# Patient Record
Sex: MALE | Race: WHITE | Employment: OTHER | RURAL
[De-identification: names, ages, dates, MRNs, and addresses within clinical notes are randomized per-mention and may not be internally consistent; named-entity substitution may affect disease eponyms.]

---

## 2017-01-09 ENCOUNTER — HOSPITAL ENCOUNTER (OUTPATIENT)
Dept: OTHER | Age: 82
Discharge: HOME | End: 2017-01-09
Attending: INTERNAL MEDICINE | Admitting: INTERNAL MEDICINE

## 2017-01-09 LAB
A/G RATIO: 1.3 (ref 0.8–2)
ALBUMIN SERPL-MCNC: 3.6 G/DL (ref 3.4–4.8)
ALP BLD-CCNC: 92 U/L (ref 25–100)
ALT SERPL-CCNC: 16 U/L (ref 4–36)
ANION GAP SERPL CALCULATED.3IONS-SCNC: 10 MMOL/L (ref 3–16)
AST SERPL-CCNC: 20 U/L (ref 8–33)
BILIRUB SERPL-MCNC: 0.5 MG/DL (ref 0.3–1.2)
BUN BLDV-MCNC: 25 MG/DL (ref 6–20)
CALCIUM SERPL-MCNC: 8.7 MG/DL (ref 8.5–10.5)
CHLORIDE BLD-SCNC: 99 MMOL/L (ref 98–107)
CO2: 33 MMOL/L (ref 20–30)
CREAT SERPL-MCNC: 1.3 MG/DL (ref 0.4–1.2)
GFR AFRICAN AMERICAN: >59
GFR NON-AFRICAN AMERICAN: 53
GLOBULIN: 2.7 G/DL
GLUCOSE BLD-MCNC: 86 MG/DL (ref 74–106)
POTASSIUM SERPL-SCNC: 3.7 MMOL/L (ref 3.4–5.1)
SODIUM BLD-SCNC: 142 MMOL/L (ref 136–145)
TOTAL PROTEIN: 6.3 G/DL (ref 6.4–8.3)

## 2017-01-26 ENCOUNTER — OUTSIDE SERVICES (OUTPATIENT)
Dept: PRIMARY CARE CLINIC | Age: 82
End: 2017-01-26
Payer: MEDICARE

## 2017-01-26 DIAGNOSIS — R05.9 COUGH: ICD-10-CM

## 2017-01-26 DIAGNOSIS — E03.9 HYPOTHYROIDISM, UNSPECIFIED TYPE: ICD-10-CM

## 2017-01-26 DIAGNOSIS — Z86.79 HISTORY OF INTRACRANIAL HEMORRHAGE: ICD-10-CM

## 2017-01-26 DIAGNOSIS — I48.91 ATRIAL FIBRILLATION, UNSPECIFIED TYPE (HCC): ICD-10-CM

## 2017-01-26 DIAGNOSIS — I69.359 CVA, OLD, HEMIPARESIS (HCC): Primary | ICD-10-CM

## 2017-01-26 DIAGNOSIS — D64.9 ANEMIA, UNSPECIFIED TYPE: ICD-10-CM

## 2017-01-26 DIAGNOSIS — I10 ESSENTIAL HYPERTENSION: ICD-10-CM

## 2017-01-26 PROCEDURE — 99308 SBSQ NF CARE LOW MDM 20: CPT | Performed by: INTERNAL MEDICINE

## 2017-03-27 ENCOUNTER — OUTSIDE SERVICES (OUTPATIENT)
Dept: PRIMARY CARE CLINIC | Age: 82
End: 2017-03-27
Payer: MEDICARE

## 2017-03-27 DIAGNOSIS — I48.91 ATRIAL FIBRILLATION, UNSPECIFIED TYPE (HCC): ICD-10-CM

## 2017-03-27 DIAGNOSIS — D64.9 ANEMIA, UNSPECIFIED TYPE: ICD-10-CM

## 2017-03-27 DIAGNOSIS — I69.359 CVA, OLD, HEMIPARESIS (HCC): Primary | ICD-10-CM

## 2017-03-27 DIAGNOSIS — I10 ESSENTIAL HYPERTENSION: ICD-10-CM

## 2017-03-27 DIAGNOSIS — Z12.5 SCREENING FOR PROSTATE CANCER: ICD-10-CM

## 2017-03-27 DIAGNOSIS — R05.9 COUGH: ICD-10-CM

## 2017-03-27 PROCEDURE — 99309 SBSQ NF CARE MODERATE MDM 30: CPT | Performed by: INTERNAL MEDICINE

## 2017-04-03 ENCOUNTER — HOSPITAL ENCOUNTER (OUTPATIENT)
Dept: OTHER | Age: 82
Discharge: HOME | End: 2017-04-03
Attending: INTERNAL MEDICINE | Admitting: INTERNAL MEDICINE

## 2017-04-03 LAB
ANION GAP SERPL CALCULATED.3IONS-SCNC: 7 MMOL/L (ref 3–16)
BASOPHILS ABSOLUTE: 0.1 K/UL (ref 0–0.1)
BASOPHILS RELATIVE PERCENT: 0.9 %
BUN BLDV-MCNC: 23 MG/DL (ref 6–20)
CALCIUM SERPL-MCNC: 8.7 MG/DL (ref 8.5–10.5)
CHLORIDE BLD-SCNC: 102 MMOL/L (ref 98–107)
CHOLESTEROL, TOTAL: 132 MG/DL (ref 0–200)
CO2: 32 MMOL/L (ref 20–30)
CREAT SERPL-MCNC: 1.3 MG/DL (ref 0.4–1.2)
EOSINOPHILS ABSOLUTE: 0.3 K/UL (ref 0–0.4)
EOSINOPHILS RELATIVE PERCENT: 3.9 %
GFR AFRICAN AMERICAN: >59
GFR NON-AFRICAN AMERICAN: 53
GLUCOSE BLD-MCNC: 82 MG/DL (ref 74–106)
HCT VFR BLD CALC: 35.1 % (ref 40–54)
HDLC SERPL-MCNC: 50 MG/DL (ref 40–60)
HEMOGLOBIN: 12.1 G/DL (ref 13–18)
LDL CHOLESTEROL CALCULATED: 62 MG/DL
LYMPHOCYTES ABSOLUTE: 3.1 K/UL (ref 1.5–4)
LYMPHOCYTES RELATIVE PERCENT: 37.3 % (ref 20–40)
MCH RBC QN AUTO: 32.2 PG (ref 27–32)
MCHC RBC AUTO-ENTMCNC: 34.5 G/DL (ref 31–35)
MCV RBC AUTO: 93.4 FL (ref 80–100)
MONOCYTES ABSOLUTE: 0.6 K/UL (ref 0.2–0.8)
MONOCYTES RELATIVE PERCENT: 7.2 % (ref 3–10)
NEUTROPHILS ABSOLUTE: 4.2 K/UL (ref 2–7.5)
NEUTROPHILS RELATIVE PERCENT: 50.7 %
PDW BLD-RTO: 13.9 % (ref 11–16)
PLATELET # BLD: 338 K/UL (ref 150–400)
PMV BLD AUTO: 8.6 FL (ref 6–10)
POTASSIUM SERPL-SCNC: 3.8 MMOL/L (ref 3.4–5.1)
RBC # BLD: 3.76 M/UL (ref 4.5–6)
SODIUM BLD-SCNC: 141 MMOL/L (ref 136–145)
TRIGL SERPL-MCNC: 102 MG/DL (ref 0–249)
TSH SERPL DL<=0.05 MIU/L-ACNC: 4.99 UIU/ML (ref 0.35–5.5)
VLDLC SERPL CALC-MCNC: 20 MG/DL
WBC # BLD: 8.2 K/UL (ref 4–11)

## 2017-04-10 ENCOUNTER — HOSPITAL ENCOUNTER (OUTPATIENT)
Dept: OTHER | Age: 82
Discharge: HOME | End: 2017-04-10
Attending: INTERNAL MEDICINE | Admitting: INTERNAL MEDICINE

## 2017-04-10 DIAGNOSIS — R73.09 ELEVATED GLUCOSE: Primary | ICD-10-CM

## 2017-04-10 LAB
A/G RATIO: 1.3 (ref 0.8–2)
ALBUMIN SERPL-MCNC: 3.5 G/DL (ref 3.4–4.8)
ALP BLD-CCNC: 82 U/L (ref 25–100)
ALT SERPL-CCNC: 13 U/L (ref 4–36)
ANION GAP SERPL CALCULATED.3IONS-SCNC: 9 MMOL/L (ref 3–16)
AST SERPL-CCNC: 18 U/L (ref 8–33)
BASOPHILS ABSOLUTE: 0.1 K/UL (ref 0–0.1)
BASOPHILS RELATIVE PERCENT: 1 %
BILIRUB SERPL-MCNC: 0.3 MG/DL (ref 0.3–1.2)
BUN BLDV-MCNC: 29 MG/DL (ref 6–20)
CALCIUM SERPL-MCNC: 8.7 MG/DL (ref 8.5–10.5)
CHLORIDE BLD-SCNC: 99 MMOL/L (ref 98–107)
CHOLESTEROL, TOTAL: 149 MG/DL (ref 0–200)
CO2: 32 MMOL/L (ref 20–30)
CREAT SERPL-MCNC: 1.4 MG/DL (ref 0.4–1.2)
EOSINOPHILS ABSOLUTE: 0.1 K/UL (ref 0–0.4)
EOSINOPHILS RELATIVE PERCENT: 1.8 %
GFR AFRICAN AMERICAN: 59
GFR NON-AFRICAN AMERICAN: 48
GLOBULIN: 2.6 G/DL
GLUCOSE BLD-MCNC: 183 MG/DL (ref 74–106)
HBA1C MFR BLD: 5.3 %
HCT VFR BLD CALC: 36.2 % (ref 40–54)
HDLC SERPL-MCNC: 57 MG/DL (ref 40–60)
HEMOGLOBIN: 12.1 G/DL (ref 13–18)
LDL CHOLESTEROL CALCULATED: 69 MG/DL
LYMPHOCYTES ABSOLUTE: 2.2 K/UL (ref 1.5–4)
LYMPHOCYTES RELATIVE PERCENT: 32.5 % (ref 20–40)
MCH RBC QN AUTO: 31.9 PG (ref 27–32)
MCHC RBC AUTO-ENTMCNC: 33.4 G/DL (ref 31–35)
MCV RBC AUTO: 95.5 FL (ref 80–100)
MONOCYTES ABSOLUTE: 0.5 K/UL (ref 0.2–0.8)
MONOCYTES RELATIVE PERCENT: 6.8 % (ref 3–10)
NEUTROPHILS ABSOLUTE: 3.9 K/UL (ref 2–7.5)
NEUTROPHILS RELATIVE PERCENT: 57.9 %
PDW BLD-RTO: 14.1 % (ref 11–16)
PLATELET # BLD: 246 K/UL (ref 150–400)
PMV BLD AUTO: 9.3 FL (ref 6–10)
POTASSIUM SERPL-SCNC: 3.7 MMOL/L (ref 3.4–5.1)
PROSTATE SPECIFIC ANTIGEN: 2.73 NG/ML (ref 0–4)
RBC # BLD: 3.79 M/UL (ref 4.5–6)
SODIUM BLD-SCNC: 140 MMOL/L (ref 136–145)
TOTAL PROTEIN: 6.1 G/DL (ref 6.4–8.3)
TRIGL SERPL-MCNC: 114 MG/DL (ref 0–249)
TSH SERPL DL<=0.05 MIU/L-ACNC: 4.04 UIU/ML (ref 0.35–5.5)
VLDLC SERPL CALC-MCNC: 23 MG/DL
WBC # BLD: 6.7 K/UL (ref 4–11)

## 2017-04-13 ENCOUNTER — OFFICE VISIT (OUTPATIENT)
Dept: CARDIOLOGY | Facility: CLINIC | Age: 82
End: 2017-04-13

## 2017-04-13 DIAGNOSIS — I48.0 PAROXYSMAL ATRIAL FIBRILLATION (HCC): Primary | ICD-10-CM

## 2017-04-13 DIAGNOSIS — I10 ESSENTIAL HYPERTENSION: Chronic | ICD-10-CM

## 2017-04-13 DIAGNOSIS — I35.0 NONRHEUMATIC AORTIC VALVE STENOSIS: ICD-10-CM

## 2017-04-13 DIAGNOSIS — I61.0 NONTRAUMATIC SUBCORTICAL HEMORRHAGE OF CEREBRAL HEMISPHERE, UNSPECIFIED LATERALITY (HCC): Chronic | ICD-10-CM

## 2017-04-13 PROCEDURE — 99213 OFFICE O/P EST LOW 20 MIN: CPT | Performed by: INTERNAL MEDICINE

## 2017-04-13 RX ORDER — POTASSIUM CHLORIDE 750 MG/1
10 TABLET, FILM COATED, EXTENDED RELEASE ORAL DAILY
COMMUNITY

## 2017-04-13 RX ORDER — POLYETHYLENE GLYCOL 3350 17 G/17G
17 POWDER, FOR SOLUTION ORAL DAILY
COMMUNITY

## 2017-04-13 NOTE — PROGRESS NOTES
"    Subjective:     Encounter Date:04/13/2017      Patient ID: Joe Ignacio is a 82 y.o. male.    Chief Complaint: Atrial fibrillation  HPI  This is an 82-year-old male patient with paroxysmal atrial fibrillation who is being maintained in normal sinus rhythm with amiodarone therapy.  The patient is tolerating therapy well without side effects.  The patient does not take chronic anticoagulation therapy due to a previous intracranial hemorrhage.  He is currently bound to a wheelchair due to the residual from his prior hemorrhagic stroke.  The patient indicates that he is doing well indicating that \"I have never felt better\".  Specifically he denies chest discomfort, shortness of breath, orthopnea, PND and lower extremity edema.  He has no dizziness palpitations or syncope.  The patient underwent repeat echocardiogram which showed moderate valvular aortic stenosis with preserved global and regional LV systolic function.  There were no other valvular abnormalities of significance.  The patient is a former smoker but has not smoked in the last 25 years.  He reports compliance with his medications.  He has no symptoms that he would perceives as medication related side effects.  The remainder of the patient's past medical history, family history and social history remains unchanged.  The following portions of the patient's history were reviewed and updated as appropriate: allergies, current medications, past family history, past medical history, past social history, past surgical history and problem  Review of Systems   Constitution: Negative for chills, diaphoresis, fever, weakness, malaise/fatigue, night sweats, weight gain and weight loss.   HENT: Negative for ear discharge, hearing loss, hoarse voice and nosebleeds.    Eyes: Negative for discharge, double vision, pain and photophobia.   Cardiovascular: Negative for chest pain, claudication, cyanosis, dyspnea on exertion, irregular heartbeat, leg swelling, " near-syncope, orthopnea, palpitations, paroxysmal nocturnal dyspnea and syncope.   Respiratory: Negative for cough, hemoptysis, shortness of breath, sputum production and wheezing.    Endocrine: Negative for cold intolerance, heat intolerance, polydipsia, polyphagia and polyuria.   Hematologic/Lymphatic: Negative for adenopathy and bleeding problem. Does not bruise/bleed easily.   Skin: Negative for color change, flushing, itching and rash.   Musculoskeletal: Negative for muscle cramps, muscle weakness, myalgias and stiffness.   Gastrointestinal: Negative for abdominal pain, diarrhea, hematemesis, hematochezia, nausea and vomiting.   Genitourinary: Negative for dysuria, frequency and nocturia.   Neurological: Negative for focal weakness, loss of balance, numbness, paresthesias and seizures.   Psychiatric/Behavioral: Negative for altered mental status, hallucinations and suicidal ideas.   Allergic/Immunologic: Negative for HIV exposure, hives and persistent infections.           Current Outpatient Prescriptions:   •  acetaminophen (TYLENOL) 325 MG tablet, Take 650 mg by mouth as needed for mild pain (1-3)., Disp: , Rfl:   •  AMINO ACIDS-PROTEIN HYDROLYS PO, Take 30 mL by mouth Daily., Disp: , Rfl:   •  amiodarone (PACERONE) 200 MG tablet, Take 200 mg by mouth daily., Disp: , Rfl:   •  aspirin 81 MG tablet, Take 81 mg by mouth daily., Disp: , Rfl:   •  atorvastatin (LIPITOR) 40 MG tablet, Take 40 mg by mouth daily., Disp: , Rfl:   •  docusate sodium (COLACE) 100 MG capsule, Take 100 mg by mouth daily., Disp: , Rfl:   •  enalapril (VASOTEC) 10 MG tablet, Take 10 mg by mouth daily., Disp: , Rfl:   •  famotidine (PEPCID) 20 MG tablet, Take 20 mg by mouth 2 (two) times a day., Disp: , Rfl:   •  furosemide (LASIX) 40 MG tablet, Take 40 mg by mouth daily., Disp: , Rfl:   •  hydrochlorothiazide (MICROZIDE) 12.5 MG capsule, Take 12.5 mg by mouth daily., Disp: , Rfl:   •  ipratropium-albuterol (COMBIVENT RESPIMAT)   MCG/ACT inhaler, Inhale 1 puff 4 (Four) Times a Day As Needed for Wheezing., Disp: , Rfl:   •  levothyroxine (SYNTHROID, LEVOTHROID) 50 MCG tablet, Take 75 mcg by mouth Daily., Disp: , Rfl:   •  Loratadine (CLARITIN) 10 MG capsule, Take 10 mg by mouth Daily., Disp: , Rfl:   •  metoprolol succinate XL (TOPROL-XL) 50 MG 24 hr tablet, Take 50 mg by mouth daily., Disp: , Rfl:   •  ondansetron ODT (ZOFRAN-ODT) 4 MG disintegrating tablet, Take 4 mg by mouth every 8 (eight) hours as needed for nausea or vomiting., Disp: , Rfl:   •  polyethylene glycol (MIRALAX) packet, Take 17 g by mouth Daily., Disp: , Rfl:   •  potassium chloride (K-DUR) 10 MEQ CR tablet, Take 10 mEq by mouth Daily., Disp: , Rfl:   •  tamsulosin (FLOMAX) 0.4 MG capsule 24 hr capsule, Take 1 capsule by mouth daily., Disp: , Rfl:      Objective:     Physical Exam   Constitutional: He is oriented to person, place, and time. He appears well-developed and well-nourished.   HENT:   Head: Normocephalic and atraumatic.   Eyes: Conjunctivae are normal. No scleral icterus.   Cardiovascular: Normal rate, regular rhythm and intact distal pulses.  Exam reveals no gallop and no friction rub.    Murmur heard.  High-pitched harsh crescendo-decrescendo midsystolic murmur is present with a grade of 3/6   Pulmonary/Chest: Effort normal and breath sounds normal. No respiratory distress.   Abdominal: Soft. Bowel sounds are normal. There is no tenderness.   Musculoskeletal: He exhibits no edema.   Neurological: He is alert and oriented to person, place, and time.   Skin: Skin is warm and dry.   Psychiatric: He has a normal mood and affect. His behavior is normal.     There were no vitals taken for this visit.   Lab Review:       Assessment:         1. Paroxysmal atrial fibrillation  The patient is maintaining normal sinus rhythm on low-dose amiodarone.  He is not on anticoagulation therapy due to a previous intracranial hemorrhage.  He takes low-dose aspirin.    2. Essential  hypertension  Excellent blood pressure control by today's data.    3. Nontraumatic subcortical hemorrhage of cerebral hemisphere, unspecified laterality  No evidence of recurrent hemorrhagic events.    4. Nonrheumatic aortic valve stenosis  He recently underwent a repeat echocardiogram which showed moderate aortic stenosis with normal LV systolic function.  There was concentric left ventricular hypertrophy.  There were no other valvular abnormalities.  He is asymptomatic with New York Heart Association functional class I symptoms.    Procedures     Plan:       No change in his medication profile is indicated at this time.  No additional cardiovascular testing is indicated.

## 2017-05-22 ENCOUNTER — OUTSIDE SERVICES (OUTPATIENT)
Dept: PRIMARY CARE CLINIC | Age: 82
End: 2017-05-22
Payer: MEDICARE

## 2017-05-22 DIAGNOSIS — D64.9 ANEMIA, UNSPECIFIED TYPE: ICD-10-CM

## 2017-05-22 DIAGNOSIS — E03.9 HYPOTHYROIDISM, UNSPECIFIED TYPE: ICD-10-CM

## 2017-05-22 DIAGNOSIS — I69.359 CVA, OLD, HEMIPARESIS (HCC): ICD-10-CM

## 2017-05-22 DIAGNOSIS — I48.91 ATRIAL FIBRILLATION, UNSPECIFIED TYPE (HCC): ICD-10-CM

## 2017-05-22 DIAGNOSIS — I10 ESSENTIAL HYPERTENSION: Primary | ICD-10-CM

## 2017-05-22 PROCEDURE — 99308 SBSQ NF CARE LOW MDM 20: CPT | Performed by: INTERNAL MEDICINE

## 2017-07-17 ENCOUNTER — HOSPITAL ENCOUNTER (OUTPATIENT)
Dept: OTHER | Age: 82
Discharge: HOME | End: 2017-07-17
Attending: INTERNAL MEDICINE | Admitting: INTERNAL MEDICINE

## 2017-07-17 LAB
A/G RATIO: 1.2 (ref 0.8–2)
ALBUMIN SERPL-MCNC: 3.7 G/DL (ref 3.4–4.8)
ALP BLD-CCNC: 104 U/L (ref 25–100)
ALT SERPL-CCNC: 19 U/L (ref 4–36)
ANION GAP SERPL CALCULATED.3IONS-SCNC: 11 MMOL/L (ref 3–16)
AST SERPL-CCNC: 23 U/L (ref 8–33)
BASOPHILS ABSOLUTE: 0.1 K/UL (ref 0–0.1)
BASOPHILS RELATIVE PERCENT: 0.7 %
BILIRUB SERPL-MCNC: 0.5 MG/DL (ref 0.3–1.2)
BUN BLDV-MCNC: 24 MG/DL (ref 6–20)
CALCIUM SERPL-MCNC: 8.9 MG/DL (ref 8.5–10.5)
CHLORIDE BLD-SCNC: 98 MMOL/L (ref 98–107)
CO2: 31 MMOL/L (ref 20–30)
CREAT SERPL-MCNC: 1.3 MG/DL (ref 0.4–1.2)
EOSINOPHILS ABSOLUTE: 0.2 K/UL (ref 0–0.4)
EOSINOPHILS RELATIVE PERCENT: 2.5 %
FERRITIN: 207 NG/ML (ref 22–322)
GFR AFRICAN AMERICAN: >59
GFR NON-AFRICAN AMERICAN: 53
GLOBULIN: 3.1 G/DL
GLUCOSE BLD-MCNC: 112 MG/DL (ref 74–106)
HCT VFR BLD CALC: 38.9 % (ref 40–54)
HEMOGLOBIN: 13.1 G/DL (ref 13–18)
IRON: 96 UG/DL (ref 59–158)
LYMPHOCYTES ABSOLUTE: 1.9 K/UL (ref 1.5–4)
LYMPHOCYTES RELATIVE PERCENT: 27.6 % (ref 20–40)
MCH RBC QN AUTO: 31.6 PG (ref 27–32)
MCHC RBC AUTO-ENTMCNC: 33.7 G/DL (ref 31–35)
MCV RBC AUTO: 94 FL (ref 80–100)
MONOCYTES ABSOLUTE: 0.5 K/UL (ref 0.2–0.8)
MONOCYTES RELATIVE PERCENT: 7.7 % (ref 3–10)
NEUTROPHILS ABSOLUTE: 4.1 K/UL (ref 2–7.5)
NEUTROPHILS RELATIVE PERCENT: 61.5 %
PDW BLD-RTO: 13.5 % (ref 11–16)
PLATELET # BLD: 226 K/UL (ref 150–400)
PMV BLD AUTO: 9.1 FL (ref 6–10)
POTASSIUM SERPL-SCNC: 3.4 MMOL/L (ref 3.4–5.1)
RBC # BLD: 4.14 M/UL (ref 4.5–6)
SODIUM BLD-SCNC: 140 MMOL/L (ref 136–145)
TOTAL IRON BINDING CAPACITY: 225 UG/DL (ref 250–450)
TOTAL PROTEIN: 6.8 G/DL (ref 6.4–8.3)
WBC # BLD: 6.7 K/UL (ref 4–11)

## 2017-07-31 ENCOUNTER — OUTSIDE SERVICES (OUTPATIENT)
Dept: PRIMARY CARE CLINIC | Age: 82
End: 2017-07-31
Payer: MEDICARE

## 2017-07-31 DIAGNOSIS — I69.359 CVA, OLD, HEMIPARESIS (HCC): ICD-10-CM

## 2017-07-31 DIAGNOSIS — E03.9 HYPOTHYROIDISM, UNSPECIFIED TYPE: ICD-10-CM

## 2017-07-31 DIAGNOSIS — I48.91 ATRIAL FIBRILLATION, UNSPECIFIED TYPE (HCC): ICD-10-CM

## 2017-07-31 DIAGNOSIS — I10 ESSENTIAL HYPERTENSION: Primary | ICD-10-CM

## 2017-07-31 DIAGNOSIS — D64.9 ANEMIA, UNSPECIFIED TYPE: ICD-10-CM

## 2017-07-31 PROCEDURE — 99318 PR E/M ANNUAL NURSING FACILITY ASSESS STABLE 30 MIN: CPT | Performed by: INTERNAL MEDICINE

## 2017-09-18 ENCOUNTER — HOSPITAL ENCOUNTER (OUTPATIENT)
Dept: OTHER | Age: 82
Discharge: HOME | End: 2017-09-18
Attending: INTERNAL MEDICINE | Admitting: INTERNAL MEDICINE

## 2017-09-18 LAB
A/G RATIO: 1.3 (ref 0.8–2)
ALBUMIN SERPL-MCNC: 3.4 G/DL (ref 3.4–4.8)
ALP BLD-CCNC: 96 U/L (ref 25–100)
ALT SERPL-CCNC: 21 U/L (ref 4–36)
ANION GAP SERPL CALCULATED.3IONS-SCNC: 11 MMOL/L (ref 3–16)
AST SERPL-CCNC: 23 U/L (ref 8–33)
BILIRUB SERPL-MCNC: 0.4 MG/DL (ref 0.3–1.2)
BUN BLDV-MCNC: 24 MG/DL (ref 6–20)
CALCIUM SERPL-MCNC: 8.4 MG/DL (ref 8.5–10.5)
CHLORIDE BLD-SCNC: 100 MMOL/L (ref 98–107)
CO2: 29 MMOL/L (ref 20–30)
CREAT SERPL-MCNC: 1.4 MG/DL (ref 0.4–1.2)
GFR AFRICAN AMERICAN: 59
GFR NON-AFRICAN AMERICAN: 48
GLOBULIN: 2.7 G/DL
GLUCOSE BLD-MCNC: 130 MG/DL (ref 74–106)
POTASSIUM SERPL-SCNC: 3.5 MMOL/L (ref 3.4–5.1)
SODIUM BLD-SCNC: 140 MMOL/L (ref 136–145)
TOTAL PROTEIN: 6.1 G/DL (ref 6.4–8.3)
TSH SERPL DL<=0.05 MIU/L-ACNC: 4.64 UIU/ML (ref 0.35–5.5)

## 2017-09-25 ENCOUNTER — OUTSIDE SERVICES (OUTPATIENT)
Dept: PRIMARY CARE CLINIC | Age: 82
End: 2017-09-25
Payer: MEDICARE

## 2017-09-25 DIAGNOSIS — I69.359 CVA, OLD, HEMIPARESIS (HCC): ICD-10-CM

## 2017-09-25 DIAGNOSIS — Z87.01 HISTORY OF PNEUMONIA: ICD-10-CM

## 2017-09-25 DIAGNOSIS — I10 ESSENTIAL HYPERTENSION: Primary | ICD-10-CM

## 2017-09-25 DIAGNOSIS — E03.9 HYPOTHYROIDISM, UNSPECIFIED TYPE: ICD-10-CM

## 2017-09-25 DIAGNOSIS — I48.91 ATRIAL FIBRILLATION, UNSPECIFIED TYPE (HCC): ICD-10-CM

## 2017-09-25 DIAGNOSIS — D64.9 ANEMIA, UNSPECIFIED TYPE: ICD-10-CM

## 2017-09-25 PROCEDURE — 99308 SBSQ NF CARE LOW MDM 20: CPT | Performed by: INTERNAL MEDICINE

## 2017-09-25 NOTE — PROGRESS NOTES
GLOB 2.7 09/18/2017           Lab Results   Component Value Date    WBC 6.7 07/17/2017    HGB 13.1 07/17/2017    HCT 38.9 (L) 07/17/2017    MCV 94.0 07/17/2017     07/17/2017       Lab Results   Component Value Date    TSH 4.64 09/18/2017       Lab Results   Component Value Date    LABA1C 5.3 04/10/2017       ASSESSMENT/PLAN:     1. Essential hypertension  Blood pressure has been stable. Continue current regimen. Monitor renal function periodically. Discussed appropriate diet. 2. CVA, old, hemiparesis ([address]  Stable. Encourage increase activity level. Make sure blood pressure, lipid profile under good control. 3. Anemia, unspecified type  Most recent hemoglobin is up to normal. Monitor closely. 4. Atrial fibrillation, unspecified type ([address]  Sinus on exam. Not a lot of granulation related to history of hemorrhagic CVA. 5. Hypothyroidism, unspecified type  Recent TSH is within normal limits. Continue current regimen. Check TSH every 6-12 months. 6. History of pneumonia  Doing better. Continue to monitor. Discussed the importance of deep breathing technique, cough and increase activity level. Monitor any issues with possible aspiration.         Fouzia Vazquez MD on 9/25/2017 at 10:25 AM

## 2017-11-27 ENCOUNTER — OUTSIDE SERVICES (OUTPATIENT)
Dept: PRIMARY CARE CLINIC | Age: 82
End: 2017-11-27
Payer: MEDICARE

## 2017-11-27 DIAGNOSIS — E03.9 HYPOTHYROIDISM, UNSPECIFIED TYPE: ICD-10-CM

## 2017-11-27 DIAGNOSIS — I48.91 ATRIAL FIBRILLATION, UNSPECIFIED TYPE (HCC): Primary | ICD-10-CM

## 2017-11-27 DIAGNOSIS — R73.9 HYPERGLYCEMIA: ICD-10-CM

## 2017-11-27 DIAGNOSIS — D64.9 ANEMIA, UNSPECIFIED TYPE: ICD-10-CM

## 2017-11-27 DIAGNOSIS — I69.359 CVA, OLD, HEMIPARESIS (HCC): ICD-10-CM

## 2017-11-27 DIAGNOSIS — I10 ESSENTIAL HYPERTENSION: ICD-10-CM

## 2017-11-27 PROCEDURE — 99308 SBSQ NF CARE LOW MDM 20: CPT | Performed by: INTERNAL MEDICINE

## 2017-11-27 NOTE — PROGRESS NOTES
[address]   1935  11/27/17      CHIEF COMPLAINT:    Patient is long-term resident at the facility. Patient is due for a follow-up visit on medical problem which include hypertension, history of CVA with residual deficit and A. fib    HPI:   The patient is a 44-year-old male with a past medical history significant for hypertension, atrial fibrillation and history of CVA with residual right-sided deficit who is a long-term resident at [place] [address]. Patient reports doing well. Patient with right-sided weakness related to prior CVA that is stable. Appetite and oral intake reportedly stable. Denies acute pain. Patient uses wheelchair in the nursing facility for ambulation. Prior history of A. fib but no chest pain or palpitation. Blood pressure has been stable per nursing home chart/nursing report. Patient has been adherent to prescribed treatment regimen and denies side effects. Patient's past medical, surgical, social and family histories were reviewed as documented in previous note/chart. Medication list and allergies reviewed as documented in the paper chart of the facility. Review of Systems  Constitutional:  Denies fever or chills   Eyes:  Denies eye pain or redness  HENT:  Denies nasal congestion or sore throat   Respiratory:  Denies cough or shortness of breath   Cardiovascular:  Denies chest pain or palpitations. Positive for mild edema BLE  GI:  Denies abdominal pain, nausea, vomiting or diarrhea   :  Denies dysuria or frequency  Musculoskeletal:  Denies acute neck pain or body aches. Positive for chronic arthralgias and chronic difficulty with gait  Integument:  Denies rash or itching  Neurologic:  Denies headache or dizziness.  Positive for chronic right sided weakness and chronic mild slurred speech from prior CVA  Psychiatric:  Denies acute depression or acute anxiety    Vitals:  Vital signs reviewed in nursing home paper chart      Physical Exam  Constitutional:  Well developed, well nourished, no acute distress  Eyes:  PERRL, no scleral icterus, conjunctiva normal   HENT:  Atraumatic, external ears normal, nose normal, oropharynx moist, no pharyngeal exudates. Neck- supple, no JVD, no lymphadenopathy  Respiratory:  No respiratory distress, breath sounds decreased bilateral bases, no wheezing, rales or rhonchi detected  Cardiovascular:  Normal rate, normal rhythm. + systolic murmurs, no gallops, no rubs, trace edema BLE- left slightly greater than right  GI:  Soft, nondistended, normal bowel sounds, nontender, no voluntary guarding  Musculoskeletal:  No cyanosis, clubbing or obvious acute deformity  Integument:  Warm and dry. Neurologic:  Alert & oriented x 3, speech is mildly dysarthric-chronic and stable, right-sided hemiparesis-chronic and stable  Psychiatric:  Pleasant. Normal mood and affect      Lab Results   Component Value Date     09/18/2017    K 3.5 09/18/2017     09/18/2017    CO2 29 09/18/2017    BUN 24 (H) 09/18/2017    CREATININE 1.4 (H) 09/18/2017    GLUCOSE 130 (H) 09/18/2017    CALCIUM 8.4 (L) 09/18/2017    PROT 6.1 (L) 09/18/2017    LABALBU 3.4 09/18/2017    BILITOT 0.4 09/18/2017    ALKPHOS 96 09/18/2017    AST 23 09/18/2017    ALT 21 09/18/2017    LABGLOM 48 (L) 09/18/2017    GFRAA 59 (L) 09/18/2017    AGRATIO 1.3 09/18/2017    GLOB 2.7 09/18/2017           Lab Results   Component Value Date    WBC 6.7 07/17/2017    HGB 13.1 07/17/2017    HCT 38.9 (L) 07/17/2017    MCV 94.0 07/17/2017     07/17/2017       Lab Results   Component Value Date    TSH 4.64 09/18/2017       Lab Results   Component Value Date    LABA1C 5.3 04/10/2017     Lab Results   Component Value Date    LDLCALC 69 04/10/2017       ASSESSMENT/PLAN:     1. Atrial fibrillation, unspecified type (HCC)  Normal sinus rhythm on exam. Not on anticoagulation due to history of hemorrhagic CVA.     2. Essential hypertension  Stable. Continue current regimen. Will monitor. 3. CVA, old, hemiparesis ([facility] [address] [facility]  Related to history of hemorrhagic CVA. Residual symptoms stable compared to before. Make sure blood pressure is under good control. Lipid panel done 4/10/2017 at goal.    4. Anemia, unspecified type  Resolved. CBC done on 7/17/2017 shows improvement in hemoglobin. Monitor periodically. 5. Hypothyroidism, unspecified type  TSH done 9/18/2017 within acceptable limits. Continue regimen. 6. Hyperglycemia  Slightly elevated glucose on prior fasting blood work. Monitor weight. Discussed appropriate diet. Check A1c with next blood work. CBC, CMP, A1c in few weeks.         Derrell Ponce MD on 11/27/2017

## 2018-01-15 ENCOUNTER — HOSPITAL ENCOUNTER (OUTPATIENT)
Dept: OTHER | Age: 83
Discharge: HOME | End: 2018-01-15
Attending: INTERNAL MEDICINE | Admitting: INTERNAL MEDICINE

## 2018-01-15 LAB
A/G RATIO: 1.2 (ref 0.8–2)
ALBUMIN SERPL-MCNC: 3.4 G/DL (ref 3.4–4.8)
ALP BLD-CCNC: 102 U/L (ref 25–100)
ALT SERPL-CCNC: 14 U/L (ref 4–36)
ANION GAP SERPL CALCULATED.3IONS-SCNC: 7 MMOL/L (ref 3–16)
AST SERPL-CCNC: 19 U/L (ref 8–33)
BASOPHILS ABSOLUTE: 0.1 K/UL (ref 0–0.1)
BASOPHILS RELATIVE PERCENT: 0.7 %
BILIRUB SERPL-MCNC: 0.4 MG/DL (ref 0.3–1.2)
BUN BLDV-MCNC: 20 MG/DL (ref 6–20)
CALCIUM SERPL-MCNC: 8.5 MG/DL (ref 8.5–10.5)
CHLORIDE BLD-SCNC: 102 MMOL/L (ref 98–107)
CO2: 34 MMOL/L (ref 20–30)
CREAT SERPL-MCNC: 1.4 MG/DL (ref 0.4–1.2)
EOSINOPHILS ABSOLUTE: 0.3 K/UL (ref 0–0.4)
EOSINOPHILS RELATIVE PERCENT: 3.6 %
GFR AFRICAN AMERICAN: 59
GFR NON-AFRICAN AMERICAN: 48
GLOBULIN: 2.9 G/DL
GLUCOSE BLD-MCNC: 117 MG/DL (ref 74–106)
HBA1C MFR BLD: 5 %
HCT VFR BLD CALC: 35.9 % (ref 40–54)
HEMOGLOBIN: 11.6 G/DL (ref 13–18)
IMMATURE GRANULOCYTES #: 0 K/UL
IMMATURE GRANULOCYTES %: 0.4 % (ref 0–5)
LYMPHOCYTES ABSOLUTE: 2 K/UL (ref 1.5–4)
LYMPHOCYTES RELATIVE PERCENT: 24.7 %
MCH RBC QN AUTO: 31.2 PG (ref 27–32)
MCHC RBC AUTO-ENTMCNC: 32.3 G/DL (ref 31–35)
MCV RBC AUTO: 96.5 FL (ref 80–100)
MONOCYTES ABSOLUTE: 0.5 K/UL (ref 0.2–0.8)
MONOCYTES RELATIVE PERCENT: 6.2 %
NEUTROPHILS ABSOLUTE: 5.3 K/UL (ref 2–7.5)
NEUTROPHILS RELATIVE PERCENT: 64.4 %
PDW BLD-RTO: 13.5 % (ref 11–16)
PLATELET # BLD: 243 K/UL (ref 150–400)
PMV BLD AUTO: 9.6 FL (ref 6–10)
POTASSIUM SERPL-SCNC: 3.5 MMOL/L (ref 3.4–5.1)
RBC # BLD: 3.72 M/UL (ref 4.5–6)
SODIUM BLD-SCNC: 143 MMOL/L (ref 136–145)
TOTAL PROTEIN: 6.3 G/DL (ref 6.4–8.3)
WBC # BLD: 8.3 K/UL (ref 4–11)

## 2018-01-22 ENCOUNTER — OUTSIDE SERVICES (OUTPATIENT)
Dept: PRIMARY CARE CLINIC | Age: 83
End: 2018-01-22
Payer: MEDICARE

## 2018-01-22 DIAGNOSIS — D64.9 ANEMIA, UNSPECIFIED TYPE: ICD-10-CM

## 2018-01-22 DIAGNOSIS — I10 ESSENTIAL HYPERTENSION: ICD-10-CM

## 2018-01-22 DIAGNOSIS — I48.91 ATRIAL FIBRILLATION, UNSPECIFIED TYPE (HCC): Primary | ICD-10-CM

## 2018-01-22 DIAGNOSIS — E03.9 HYPOTHYROIDISM, UNSPECIFIED TYPE: ICD-10-CM

## 2018-01-22 DIAGNOSIS — I69.359 CVA, OLD, HEMIPARESIS (HCC): ICD-10-CM

## 2018-01-22 DIAGNOSIS — R73.9 HYPERGLYCEMIA: ICD-10-CM

## 2018-01-22 PROCEDURE — 99318 PR E/M ANNUAL NURSING FACILITY ASSESS STABLE 30 MIN: CPT | Performed by: INTERNAL MEDICINE

## 2018-01-22 NOTE — PROGRESS NOTES
[address]   1935  01/22/18    CHIEF COMPLAINT:    Patient is a long-term resident at the facility. Due for yearly H&P. Medical problems includes A. fib, hypertension, prior CVA and others. HPI:   Patient is an 27-year-old male who has been resident at the facility for almost 2 years. He did suffer from CVA that resulted in right sided weakness/deficit. He was found to have A. fib. Patient reported that his weakness has been stable for the past several months. His mobility is limited. He is using wheelchair to ambulate. He denies any trouble swallowing. Appetite has been stable. He denies being depressed. No chest palpitation. He is not on anticoagulation related to prior intracranial bleed. Blood pressure has been stable. Occasional swelling in the lower extremities and more on the left side. He did have intermittent cough but is doing much better over the past few weeks. Medication list and allergies reviewed as documented in the paper chart of the facility. Review of Systems  Constitutional:  Denies fever or chills   Eyes:  Denies eye pain or redness  HENT:  Denies nasal congestion or sore throat   Respiratory:  Denies cough or shortness of breath   Cardiovascular:  Denies chest pain or palpitations. Positive for mild edema BLE  GI:  Denies abdominal pain, nausea, vomiting or diarrhea   :  Denies dysuria or frequency  Musculoskeletal:  Denies acute neck pain or body aches. Positive for chronic arthralgias and chronic difficulty with gait  Integument:  Denies rash or itching  Neurologic:  Denies headache or dizziness.  Positive for chronic right sided weakness and chronic mild slurred speech from prior CVA  Psychiatric:  Denies acute depression or acute anxiety      Past Medical History:   Diagnosis Date    Atrial fibrillation (HCC)     Cerebral artery occlusion with cerebral infarction ([facility] [address] [facility]     Confusion     CVA (cerebral vascular accident) (H) 01/15/2018    AST 19 01/15/2018    ALT 14 01/15/2018    LABGLOM 48 (L) 01/15/2018    GFRAA 59 (L) 01/15/2018    AGRATIO 1.2 01/15/2018    GLOB 2.9 01/15/2018           Lab Results   Component Value Date    WBC 8.3 01/15/2018    HGB 11.6 (L) 01/15/2018    HCT 35.9 (L) 01/15/2018    MCV 96.5 01/15/2018     01/15/2018       Lab Results   Component Value Date    TSH 4.64 09/18/2017       Lab Results   Component Value Date    LABA1C 5.0 01/15/2018       ASSESSMENT/PLAN:     1. Atrial fibrillation, unspecified type ([address]  Sinus rhythm on exam. Continue aspirin and current regimen. Not on onto coagulation related to history of intracranial bleed. 2. Essential hypertension  Stable. Continue to monitor. Monitor renal function periodically. 3. CVA, old, hemiparesis, unspecified type ([address]   clinically stable. Continue aspirin. Encourage increase activity level. 4. Anemia, unspecified type  Mild. Check anemia workup and monitor hemoglobin level. Further recommendations based on test results. 5. Hyperglycemia  questionable glucose intolerance. Last A1c is 5    6. Hypothyroidism, unspecified type  Continue current dose of Synthroid. Last TSH within normal limits. Check TSH every 6-12 months. CMP, CBC, K86, TSH, folic acid, iron, ferritin in 2 months.             Mali Terrazas MD on 1/22/2018 at 9:15 AM

## 2018-03-19 ENCOUNTER — OUTSIDE SERVICES (OUTPATIENT)
Dept: PRIMARY CARE CLINIC | Age: 83
End: 2018-03-19
Payer: MEDICARE

## 2018-03-19 DIAGNOSIS — N18.30 CHRONIC RENAL FAILURE, STAGE 3 (MODERATE) (HCC): ICD-10-CM

## 2018-03-19 DIAGNOSIS — R53.81 DECLINING FUNCTIONAL STATUS: Primary | ICD-10-CM

## 2018-03-19 DIAGNOSIS — I10 ESSENTIAL HYPERTENSION: ICD-10-CM

## 2018-03-19 DIAGNOSIS — R73.9 HYPERGLYCEMIA: ICD-10-CM

## 2018-03-19 DIAGNOSIS — E03.9 HYPOTHYROIDISM, UNSPECIFIED TYPE: ICD-10-CM

## 2018-03-19 DIAGNOSIS — I48.91 ATRIAL FIBRILLATION, UNSPECIFIED TYPE (HCC): ICD-10-CM

## 2018-03-19 PROCEDURE — 99308 SBSQ NF CARE LOW MDM 20: CPT | Performed by: INTERNAL MEDICINE

## 2018-04-09 ENCOUNTER — HOSPITAL ENCOUNTER (OUTPATIENT)
Dept: OTHER | Age: 83
Discharge: HOME | End: 2018-04-09
Attending: INTERNAL MEDICINE | Admitting: INTERNAL MEDICINE

## 2018-04-09 LAB
A/G RATIO: 1.4 (ref 0.8–2)
ALBUMIN SERPL-MCNC: 3.6 G/DL (ref 3.4–4.8)
ALP BLD-CCNC: 96 U/L (ref 25–100)
ALT SERPL-CCNC: 21 U/L (ref 4–36)
ANION GAP SERPL CALCULATED.3IONS-SCNC: 9 MMOL/L (ref 3–16)
AST SERPL-CCNC: 25 U/L (ref 8–33)
BASOPHILS ABSOLUTE: 0.1 K/UL (ref 0–0.1)
BASOPHILS RELATIVE PERCENT: 0.8 %
BILIRUB SERPL-MCNC: 0.4 MG/DL (ref 0.3–1.2)
BUN BLDV-MCNC: 23 MG/DL (ref 6–20)
CALCIUM SERPL-MCNC: 8.5 MG/DL (ref 8.5–10.5)
CHLORIDE BLD-SCNC: 100 MMOL/L (ref 98–107)
CO2: 33 MMOL/L (ref 20–30)
CREAT SERPL-MCNC: 1.3 MG/DL (ref 0.4–1.2)
EOSINOPHILS ABSOLUTE: 0.2 K/UL (ref 0–0.4)
EOSINOPHILS RELATIVE PERCENT: 3.3 %
FERRITIN: 169.7 NG/ML (ref 22–322)
FOLATE: 15.41 NG/ML
GFR AFRICAN AMERICAN: >59
GFR NON-AFRICAN AMERICAN: 53
GLOBULIN: 2.6 G/DL
GLUCOSE BLD-MCNC: 119 MG/DL (ref 74–106)
HCT VFR BLD CALC: 39.6 % (ref 40–54)
HEMOGLOBIN: 12.7 G/DL (ref 13–18)
IMMATURE GRANULOCYTES #: 0 K/UL
IMMATURE GRANULOCYTES %: 0.5 % (ref 0–5)
IRON: 78 UG/DL (ref 59–158)
LYMPHOCYTES ABSOLUTE: 2.4 K/UL (ref 1.5–4)
LYMPHOCYTES RELATIVE PERCENT: 32.3 %
MCH RBC QN AUTO: 30.8 PG (ref 27–32)
MCHC RBC AUTO-ENTMCNC: 32.1 G/DL (ref 31–35)
MCV RBC AUTO: 96.1 FL (ref 80–100)
MONOCYTES ABSOLUTE: 0.5 K/UL (ref 0.2–0.8)
MONOCYTES RELATIVE PERCENT: 6.9 %
NEUTROPHILS ABSOLUTE: 4.1 K/UL (ref 2–7.5)
NEUTROPHILS RELATIVE PERCENT: 56.2 %
PDW BLD-RTO: 13.4 % (ref 11–16)
PLATELET # BLD: 211 K/UL (ref 150–400)
PMV BLD AUTO: 9.6 FL (ref 6–10)
POTASSIUM SERPL-SCNC: 3.5 MMOL/L (ref 3.4–5.1)
RBC # BLD: 4.12 M/UL (ref 4.5–6)
SODIUM BLD-SCNC: 142 MMOL/L (ref 136–145)
TOTAL PROTEIN: 6.2 G/DL (ref 6.4–8.3)
TSH SERPL DL<=0.05 MIU/L-ACNC: 5.73 UIU/ML (ref 0.35–5.5)
VITAMIN B-12: 477 PG/ML (ref 211–911)
WBC # BLD: 7.3 K/UL (ref 4–11)

## 2018-04-16 ENCOUNTER — HOSPITAL ENCOUNTER (OUTPATIENT)
Dept: OTHER | Age: 83
Discharge: HOME | End: 2018-04-16
Attending: INTERNAL MEDICINE | Admitting: INTERNAL MEDICINE

## 2018-04-16 LAB
A/G RATIO: 1.4 (ref 0.8–2)
ALBUMIN SERPL-MCNC: 3.8 G/DL (ref 3.4–4.8)
ALP BLD-CCNC: 95 U/L (ref 25–100)
ALT SERPL-CCNC: 20 U/L (ref 4–36)
ANION GAP SERPL CALCULATED.3IONS-SCNC: 11 MMOL/L (ref 3–16)
AST SERPL-CCNC: 27 U/L (ref 8–33)
BASOPHILS ABSOLUTE: 0.1 K/UL (ref 0–0.1)
BASOPHILS RELATIVE PERCENT: 1.1 %
BILIRUB SERPL-MCNC: 0.4 MG/DL (ref 0.3–1.2)
BUN BLDV-MCNC: 24 MG/DL (ref 6–20)
CALCIUM SERPL-MCNC: 8.8 MG/DL (ref 8.5–10.5)
CHLORIDE BLD-SCNC: 100 MMOL/L (ref 98–107)
CHOLESTEROL, TOTAL: 158 MG/DL (ref 0–200)
CO2: 31 MMOL/L (ref 20–30)
CREAT SERPL-MCNC: 1.4 MG/DL (ref 0.4–1.2)
EOSINOPHILS ABSOLUTE: 0.3 K/UL (ref 0–0.4)
EOSINOPHILS RELATIVE PERCENT: 3.7 %
GFR AFRICAN AMERICAN: 59
GFR NON-AFRICAN AMERICAN: 48
GLOBULIN: 2.7 G/DL
GLUCOSE BLD-MCNC: 133 MG/DL (ref 74–106)
HCT VFR BLD CALC: 39.2 % (ref 40–54)
HDLC SERPL-MCNC: 68 MG/DL (ref 40–60)
HEMOGLOBIN: 12.7 G/DL (ref 13–18)
IMMATURE GRANULOCYTES #: 0 K/UL
IMMATURE GRANULOCYTES %: 0.3 % (ref 0–5)
LDL CHOLESTEROL CALCULATED: 63 MG/DL
LYMPHOCYTES ABSOLUTE: 2 K/UL (ref 1.5–4)
LYMPHOCYTES RELATIVE PERCENT: 28.7 %
MCH RBC QN AUTO: 31.3 PG (ref 27–32)
MCHC RBC AUTO-ENTMCNC: 32.4 G/DL (ref 31–35)
MCV RBC AUTO: 96.6 FL (ref 80–100)
MONOCYTES ABSOLUTE: 0.4 K/UL (ref 0.2–0.8)
MONOCYTES RELATIVE PERCENT: 6.1 %
NEUTROPHILS ABSOLUTE: 4.3 K/UL (ref 2–7.5)
NEUTROPHILS RELATIVE PERCENT: 60.1 %
PDW BLD-RTO: 13.3 % (ref 11–16)
PLATELET # BLD: 218 K/UL (ref 150–400)
PMV BLD AUTO: 9.8 FL (ref 6–10)
POTASSIUM SERPL-SCNC: 3.7 MMOL/L (ref 3.4–5.1)
RBC # BLD: 4.06 M/UL (ref 4.5–6)
SODIUM BLD-SCNC: 142 MMOL/L (ref 136–145)
TOTAL PROTEIN: 6.5 G/DL (ref 6.4–8.3)
TRIGL SERPL-MCNC: 136 MG/DL (ref 0–249)
TSH SERPL DL<=0.05 MIU/L-ACNC: 4.51 UIU/ML (ref 0.35–5.5)
VLDLC SERPL CALC-MCNC: 27 MG/DL
WBC # BLD: 7.1 K/UL (ref 4–11)

## 2018-05-07 ENCOUNTER — OUTSIDE SERVICES (OUTPATIENT)
Dept: PRIMARY CARE CLINIC | Age: 83
End: 2018-05-07
Payer: MEDICARE

## 2018-05-07 DIAGNOSIS — N18.30 CHRONIC RENAL FAILURE, STAGE 3 (MODERATE) (HCC): ICD-10-CM

## 2018-05-07 DIAGNOSIS — I48.91 ATRIAL FIBRILLATION, UNSPECIFIED TYPE (HCC): ICD-10-CM

## 2018-05-07 DIAGNOSIS — E03.9 HYPOTHYROIDISM, UNSPECIFIED TYPE: ICD-10-CM

## 2018-05-07 DIAGNOSIS — I69.359 CVA, OLD, HEMIPARESIS (HCC): Primary | ICD-10-CM

## 2018-05-07 DIAGNOSIS — I10 ESSENTIAL HYPERTENSION: ICD-10-CM

## 2018-05-07 DIAGNOSIS — R60.0 EDEMA EXTREMITIES: ICD-10-CM

## 2018-05-07 PROCEDURE — 99309 SBSQ NF CARE MODERATE MDM 30: CPT | Performed by: INTERNAL MEDICINE

## 2018-07-09 ENCOUNTER — OUTSIDE SERVICES (OUTPATIENT)
Dept: PRIMARY CARE CLINIC | Age: 83
End: 2018-07-09
Payer: MEDICARE

## 2018-07-09 DIAGNOSIS — I48.91 ATRIAL FIBRILLATION, UNSPECIFIED TYPE (HCC): ICD-10-CM

## 2018-07-09 DIAGNOSIS — I69.359 CVA, OLD, HEMIPARESIS (HCC): Primary | ICD-10-CM

## 2018-07-09 DIAGNOSIS — R60.0 EDEMA EXTREMITIES: ICD-10-CM

## 2018-07-09 DIAGNOSIS — E03.9 HYPOTHYROIDISM, UNSPECIFIED TYPE: ICD-10-CM

## 2018-07-09 DIAGNOSIS — N18.30 CHRONIC RENAL FAILURE, STAGE 3 (MODERATE) (HCC): ICD-10-CM

## 2018-07-09 DIAGNOSIS — I10 ESSENTIAL HYPERTENSION: ICD-10-CM

## 2018-07-09 PROCEDURE — 99308 SBSQ NF CARE LOW MDM 20: CPT | Performed by: INTERNAL MEDICINE

## 2018-07-16 ENCOUNTER — HOSPITAL ENCOUNTER (OUTPATIENT)
Facility: HOSPITAL | Age: 83
Discharge: HOME | End: 2018-07-16
Payer: MEDICARE

## 2018-07-16 LAB
ANION GAP SERPL CALCULATED.3IONS-SCNC: 11 MMOL/L (ref 3–16)
BUN BLDV-MCNC: 25 MG/DL (ref 6–20)
CALCIUM SERPL-MCNC: 8.3 MG/DL (ref 8.5–10.5)
CHLORIDE BLD-SCNC: 98 MMOL/L (ref 98–107)
CO2: 29 MMOL/L (ref 20–30)
CREAT SERPL-MCNC: 1.6 MG/DL (ref 0.4–1.2)
GFR AFRICAN AMERICAN: 50
GFR NON-AFRICAN AMERICAN: 41
GLUCOSE BLD-MCNC: 154 MG/DL (ref 74–106)
POTASSIUM SERPL-SCNC: 3.5 MMOL/L (ref 3.4–5.1)
SODIUM BLD-SCNC: 138 MMOL/L (ref 136–145)

## 2018-07-16 PROCEDURE — 80048 BASIC METABOLIC PNL TOTAL CA: CPT

## 2018-07-24 ENCOUNTER — HOSPITAL ENCOUNTER (OUTPATIENT)
Facility: HOSPITAL | Age: 83
Discharge: HOME | End: 2018-07-24
Payer: MEDICARE

## 2018-07-24 LAB
ANION GAP SERPL CALCULATED.3IONS-SCNC: 10 MMOL/L (ref 3–16)
BUN BLDV-MCNC: 23 MG/DL (ref 6–20)
CALCIUM SERPL-MCNC: 8.7 MG/DL (ref 8.5–10.5)
CHLORIDE BLD-SCNC: 99 MMOL/L (ref 98–107)
CO2: 32 MMOL/L (ref 20–30)
CREAT SERPL-MCNC: 1.4 MG/DL (ref 0.4–1.2)
GFR AFRICAN AMERICAN: 58
GFR NON-AFRICAN AMERICAN: 48
GLUCOSE BLD-MCNC: 68 MG/DL (ref 74–106)
HCT VFR BLD CALC: 35.9 % (ref 40–54)
HEMOGLOBIN: 11.8 G/DL (ref 13–18)
MCH RBC QN AUTO: 31.3 PG (ref 27–32)
MCHC RBC AUTO-ENTMCNC: 32.9 G/DL (ref 31–35)
MCV RBC AUTO: 95.2 FL (ref 80–100)
PDW BLD-RTO: 13.9 % (ref 11–16)
PHOSPHORUS: 3.5 MG/DL (ref 2.5–4.5)
PLATELET # BLD: 200 K/UL (ref 150–400)
PMV BLD AUTO: 9.3 FL (ref 6–10)
POTASSIUM SERPL-SCNC: 3.3 MMOL/L (ref 3.4–5.1)
RBC # BLD: 3.77 M/UL (ref 4.5–6)
SODIUM BLD-SCNC: 141 MMOL/L (ref 136–145)
WBC # BLD: 7.7 K/UL (ref 4–11)

## 2018-07-24 PROCEDURE — 80048 BASIC METABOLIC PNL TOTAL CA: CPT

## 2018-07-24 PROCEDURE — 85027 COMPLETE CBC AUTOMATED: CPT

## 2018-07-24 PROCEDURE — 84100 ASSAY OF PHOSPHORUS: CPT

## 2018-08-28 ENCOUNTER — HOSPITAL ENCOUNTER (OUTPATIENT)
Facility: HOSPITAL | Age: 83
Discharge: HOME | End: 2018-08-28
Payer: MEDICARE

## 2018-08-28 LAB
ANION GAP SERPL CALCULATED.3IONS-SCNC: 8 MMOL/L (ref 3–16)
BASOPHILS ABSOLUTE: 0.1 K/UL (ref 0–0.1)
BASOPHILS RELATIVE PERCENT: 1.2 %
BUN BLDV-MCNC: 25 MG/DL (ref 6–20)
CALCIUM SERPL-MCNC: 8.7 MG/DL (ref 8.5–10.5)
CHLORIDE BLD-SCNC: 99 MMOL/L (ref 98–107)
CO2: 33 MMOL/L (ref 20–30)
CREAT SERPL-MCNC: 1.4 MG/DL (ref 0.4–1.2)
EOSINOPHILS ABSOLUTE: 0.8 K/UL (ref 0–0.4)
EOSINOPHILS RELATIVE PERCENT: 10.1 %
GFR AFRICAN AMERICAN: 58
GFR NON-AFRICAN AMERICAN: 48
GLUCOSE BLD-MCNC: 160 MG/DL (ref 74–106)
HCT VFR BLD CALC: 36.5 % (ref 40–54)
HEMOGLOBIN: 12.1 G/DL (ref 13–18)
IMMATURE GRANULOCYTES #: 0 K/UL
IMMATURE GRANULOCYTES %: 0.4 % (ref 0–5)
LYMPHOCYTES ABSOLUTE: 2.5 K/UL (ref 1.5–4)
LYMPHOCYTES RELATIVE PERCENT: 30.1 %
MCH RBC QN AUTO: 32.1 PG (ref 27–32)
MCHC RBC AUTO-ENTMCNC: 33.2 G/DL (ref 31–35)
MCV RBC AUTO: 96.8 FL (ref 80–100)
MONOCYTES ABSOLUTE: 0.6 K/UL (ref 0.2–0.8)
MONOCYTES RELATIVE PERCENT: 6.7 %
NEUTROPHILS ABSOLUTE: 4.3 K/UL (ref 2–7.5)
NEUTROPHILS RELATIVE PERCENT: 51.5 %
PDW BLD-RTO: 13.4 % (ref 11–16)
PLATELET # BLD: 208 K/UL (ref 150–400)
PMV BLD AUTO: 9.4 FL (ref 6–10)
POTASSIUM SERPL-SCNC: 3.5 MMOL/L (ref 3.4–5.1)
RBC # BLD: 3.77 M/UL (ref 4.5–6)
SODIUM BLD-SCNC: 140 MMOL/L (ref 136–145)
WBC # BLD: 8.3 K/UL (ref 4–11)

## 2018-08-28 PROCEDURE — 80048 BASIC METABOLIC PNL TOTAL CA: CPT

## 2018-08-28 PROCEDURE — 85025 COMPLETE CBC W/AUTO DIFF WBC: CPT

## 2018-09-03 ENCOUNTER — HOSPITAL ENCOUNTER (EMERGENCY)
Facility: HOSPITAL | Age: 83
Discharge: HOME | End: 2018-09-03
Attending: FAMILY MEDICINE
Payer: MEDICARE

## 2018-09-03 ENCOUNTER — APPOINTMENT (OUTPATIENT)
Dept: CT IMAGING | Facility: HOSPITAL | Age: 83
End: 2018-09-03
Payer: MEDICARE

## 2018-09-03 ENCOUNTER — APPOINTMENT (OUTPATIENT)
Dept: GENERAL RADIOLOGY | Facility: HOSPITAL | Age: 83
End: 2018-09-03
Payer: MEDICARE

## 2018-09-03 VITALS
DIASTOLIC BLOOD PRESSURE: 90 MMHG | OXYGEN SATURATION: 97 % | WEIGHT: 168 LBS | TEMPERATURE: 97.8 F | SYSTOLIC BLOOD PRESSURE: 113 MMHG | HEIGHT: 68 IN | HEART RATE: 57 BPM | BODY MASS INDEX: 25.46 KG/M2 | RESPIRATION RATE: 16 BRPM

## 2018-09-03 DIAGNOSIS — R55 NEAR SYNCOPE: Primary | ICD-10-CM

## 2018-09-03 LAB
A/G RATIO: 1.1 (ref 0.8–2)
ALBUMIN SERPL-MCNC: 3.8 G/DL (ref 3.4–4.8)
ALP BLD-CCNC: 114 U/L (ref 25–100)
ALT SERPL-CCNC: 30 U/L (ref 4–36)
ANION GAP SERPL CALCULATED.3IONS-SCNC: 9 MMOL/L (ref 3–16)
AST SERPL-CCNC: 32 U/L (ref 8–33)
BASE EXCESS VENOUS: 7.2 MMOL/L (ref -3–3)
BASOPHILS ABSOLUTE: 0.1 K/UL (ref 0–0.1)
BASOPHILS RELATIVE PERCENT: 0.9 %
BILIRUB SERPL-MCNC: 0.4 MG/DL (ref 0.3–1.2)
BILIRUBIN URINE: NEGATIVE
BLOOD, URINE: ABNORMAL
BUN BLDV-MCNC: 31 MG/DL (ref 6–20)
CALCIUM SERPL-MCNC: 8.8 MG/DL (ref 8.5–10.5)
CASTS: ABNORMAL /LPF
CHLORIDE BLD-SCNC: 98 MMOL/L (ref 98–107)
CK MB: 1.5 NG/ML (ref 0–5)
CLARITY: CLEAR
CO2: 33 MMOL/L (ref 20–30)
COLOR: YELLOW
CREAT SERPL-MCNC: 1.6 MG/DL (ref 0.4–1.2)
EOSINOPHILS ABSOLUTE: 0.5 K/UL (ref 0–0.4)
EOSINOPHILS RELATIVE PERCENT: 4.2 %
EPITHELIAL CELLS, UA: ABNORMAL /HPF
GFR AFRICAN AMERICAN: 50
GFR NON-AFRICAN AMERICAN: 41
GLOBULIN: 3.5 G/DL
GLUCOSE BLD-MCNC: 106 MG/DL (ref 74–106)
GLUCOSE URINE: NEGATIVE MG/DL
HCO3 VENOUS: 34 MMOL/L (ref 23–29)
HCT VFR BLD CALC: 38.7 % (ref 40–54)
HEMOGLOBIN: 12.7 G/DL (ref 13–18)
IMMATURE GRANULOCYTES #: 0.1 K/UL
IMMATURE GRANULOCYTES %: 0.7 % (ref 0–5)
KETONES, URINE: NEGATIVE MG/DL
LEUKOCYTE ESTERASE, URINE: NEGATIVE
LYMPHOCYTES ABSOLUTE: 1.8 K/UL (ref 1.5–4)
LYMPHOCYTES RELATIVE PERCENT: 15.7 %
MCH RBC QN AUTO: 31.1 PG (ref 27–32)
MCHC RBC AUTO-ENTMCNC: 32.8 G/DL (ref 31–35)
MCV RBC AUTO: 94.6 FL (ref 80–100)
MICROSCOPIC EXAMINATION: YES
MONOCYTES ABSOLUTE: 0.7 K/UL (ref 0.2–0.8)
MONOCYTES RELATIVE PERCENT: 6.1 %
MUCUS: ABNORMAL /LPF
NEUTROPHILS ABSOLUTE: 8.2 K/UL (ref 2–7.5)
NEUTROPHILS RELATIVE PERCENT: 72.4 %
NITRITE, URINE: NEGATIVE
O2 SAT, VEN: ABNORMAL %
O2 THERAPY: ABNORMAL
PCO2, VEN: 57.9 MMHG (ref 40–50)
PDW BLD-RTO: 13.2 % (ref 11–16)
PH UA: 6.5
PH VENOUS: 7.39 (ref 7.35–7.45)
PLATELET # BLD: 217 K/UL (ref 150–400)
PMV BLD AUTO: 8.6 FL (ref 6–10)
PO2, VEN: 32.3 MMHG (ref 25–40)
POTASSIUM SERPL-SCNC: 3.4 MMOL/L (ref 3.4–5.1)
PROTEIN UA: NEGATIVE MG/DL
RBC # BLD: 4.09 M/UL (ref 4.5–6)
RBC UA: ABNORMAL /HPF (ref 0–2)
SODIUM BLD-SCNC: 140 MMOL/L (ref 136–145)
SPECIFIC GRAVITY UA: 1.01
TCO2 CALC VENOUS: 36 MMOL/L
TOTAL CK: 74 U/L (ref 26–174)
TOTAL PROTEIN: 7.3 G/DL (ref 6.4–8.3)
TROPONIN: <0.3 NG/ML
TSH SERPL DL<=0.05 MIU/L-ACNC: 6.85 UIU/ML (ref 0.35–5.5)
URINE REFLEX TO CULTURE: ABNORMAL
URINE TYPE: ABNORMAL
UROBILINOGEN, URINE: 0.2 E.U./DL
WBC # BLD: 11.3 K/UL (ref 4–11)
WBC UA: ABNORMAL /HPF (ref 0–5)

## 2018-09-03 PROCEDURE — 70450 CT HEAD/BRAIN W/O DYE: CPT

## 2018-09-03 PROCEDURE — 99285 EMERGENCY DEPT VISIT HI MDM: CPT

## 2018-09-03 PROCEDURE — 93005 ELECTROCARDIOGRAM TRACING: CPT

## 2018-09-03 PROCEDURE — 84443 ASSAY THYROID STIM HORMONE: CPT

## 2018-09-03 PROCEDURE — 82803 BLOOD GASES ANY COMBINATION: CPT

## 2018-09-03 PROCEDURE — 84484 ASSAY OF TROPONIN QUANT: CPT

## 2018-09-03 PROCEDURE — 85025 COMPLETE CBC W/AUTO DIFF WBC: CPT

## 2018-09-03 PROCEDURE — 71045 X-RAY EXAM CHEST 1 VIEW: CPT

## 2018-09-03 PROCEDURE — 81001 URINALYSIS AUTO W/SCOPE: CPT

## 2018-09-03 PROCEDURE — 82550 ASSAY OF CK (CPK): CPT

## 2018-09-03 PROCEDURE — 36415 COLL VENOUS BLD VENIPUNCTURE: CPT

## 2018-09-03 PROCEDURE — 80053 COMPREHEN METABOLIC PANEL: CPT

## 2018-09-03 RX ORDER — POTASSIUM CHLORIDE 750 MG/1
10 CAPSULE, EXTENDED RELEASE ORAL 2 TIMES DAILY
COMMUNITY
End: 2020-01-01

## 2018-09-03 RX ORDER — LEVOTHYROXINE SODIUM 88 UG/1
88 TABLET ORAL DAILY
COMMUNITY

## 2018-09-03 RX ORDER — LORATADINE 10 MG/1
10 CAPSULE, LIQUID FILLED ORAL DAILY
COMMUNITY

## 2018-09-03 RX ORDER — FUROSEMIDE 40 MG/1
20 TABLET ORAL DAILY
COMMUNITY
End: 2020-01-01 | Stop reason: ALTCHOICE

## 2018-09-03 RX ORDER — POLYETHYLENE GLYCOL 3350 17 G/17G
17 POWDER, FOR SOLUTION ORAL DAILY PRN
COMMUNITY

## 2018-09-03 RX ORDER — GUAIFENESIN 600 MG/1
600 TABLET, EXTENDED RELEASE ORAL EVERY 12 HOURS PRN
COMMUNITY

## 2018-09-03 RX ORDER — FLUTICASONE PROPIONATE 50 MCG
2 SPRAY, SUSPENSION (ML) NASAL DAILY
COMMUNITY

## 2018-09-03 RX ORDER — MONTELUKAST SODIUM 10 MG/1
10 TABLET ORAL EVERY MORNING
COMMUNITY

## 2018-09-03 RX ORDER — HYDROCHLOROTHIAZIDE 25 MG/1
25 TABLET ORAL DAILY
Status: ON HOLD | COMMUNITY
End: 2020-01-01

## 2018-09-03 ASSESSMENT — ENCOUNTER SYMPTOMS
CHEST TIGHTNESS: 0
NAUSEA: 0
DIARRHEA: 0
ABDOMINAL PAIN: 0
VOMITING: 0
BACK PAIN: 0
SHORTNESS OF BREATH: 0

## 2018-09-03 NOTE — ED PROVIDER NOTES
[address] [place] [address] Court  eMERGENCY dEPARTMENT eNCOUnter      Pt Name: Sally Mckeon  MRN: [id number]  [place] 1935  Date of evaluation: 9/3/2018  Provider: Tonny Correia MD    [address] [place] [address]       Chief Complaint   Patient presents with    Nausea    Diarrhea         HISTORY OF PRESENT ILLNESS   (Location/Symptom, Timing/Onset, Context/Setting, Quality, Duration, Modifying Factors, Severity)  Note limiting factors. Sally Mckeon is a 80 y.o. male who presents to the emergency department for evaluation of a possible syncopal episode. Family states that the patient has had similar episodes in the past. States that he was at home today although he is usually a resident of the nursing home. States that he felt weak and almost passed out. He did lose control of his bladder and bowel. They deny any seizure-like motor activity. He denies any chest pain or palpitations. The nursing home is related to the family that this been a frequent occurrence there and it happens mainly after eating lunch. Patient's family states that he would become very lethargic acting and then these spells happen. Nursing Notes were reviewed. REVIEW OF SYSTEMS    (2-9 systems for level 4, 10 or more for level 5)     Review of Systems   Constitutional: Negative for chills and fever. Respiratory: Negative for chest tightness and shortness of breath. Cardiovascular: Negative for chest pain and palpitations. Gastrointestinal: Negative for abdominal pain, diarrhea, nausea and vomiting. Genitourinary: Negative for difficulty urinating. Musculoskeletal: Negative for back pain and neck pain. Skin: Negative for wound. Neurological: Positive for syncope. Negative for weakness, numbness and headaches. Psychiatric/Behavioral: Negative for confusion and decreased concentration. Except as noted above the remainder of the review of systems was reviewed and negative.        PAST MEDICAL this chart in the absence of a cardiologist.    EKG: normal sinus rhythm, nonspecific ST and T waves changes. RADIOLOGY:   Non-plain film images such as CT, Ultrasound and MRI are read by the radiologist. Plain radiographic images are visualized and preliminarily interpreted by the emergency physician with the below findings:        Interpretation per the Radiologist below, if available at the time of this note:    XR CHEST PORTABLE   Final Result   Impression:       1. Stable exam since April 2016. There is ill-defined opacity in the left lung base that I suspect represents either prominent pericardial fat or a small hiatal hernia. PA and lateral radiograph could be helpful for clarifying this finding. CT Head WO Contrast   Final Result   Impression:       1. No acute intracranial abnormality. 2. Stable moderate-advanced atrophy with chronic infarction in the left cerebral peduncle.             ED BEDSIDE ULTRASOUND:   Performed by ED Physician - none    LABS:  Labs Reviewed   BLOOD GAS, VENOUS - Abnormal; Notable for the following:        Result Value    pCO2, Marc 57.9 (*)     HCO3, Venous 34.0 (*)     Base Excess, Marc 7.2 (*)     O2 Sat, Marc ----- (*)     All other components within normal limits    Narrative:     Performed at:  [facility]  [address] [place] [address], [place] [address]   Phone [phone]   CBC WITH AUTO DIFFERENTIAL - Abnormal; Notable for the following:     WBC 11.3 (*)     RBC 4.09 (*)     Hemoglobin 12.7 (*)     Hematocrit 38.7 (*)     Neutrophils # 8.2 (*)     Eosinophils # 0.5 (*)     All other components within normal limits    Narrative:     Performed at:  [facility]  [address] [place] [address]  [place], [place] [address]   Phone [phone]   COMPREHENSIVE METABOLIC PANEL - Abnormal; Notable for the following:     CO2 33 (*)     BUN 31 (*)     CREATININE 1.6 (*)     GFR Non- 41 (*) GFR African American 50 (*)     Alkaline Phosphatase 114 (*)     All other components within normal limits    Narrative:     Performed at:  [address] [place] [address] Laboratory  [address] [place], [place] [address], [place] [address]   Phone [phone]   URINE RT REFLEX TO CULTURE - Abnormal; Notable for the following:     Blood, Urine TRACE-INTACT (*)     All other components within normal limits    Narrative:     Performed at:  [address] [place] [address] Laboratory  [address] [place], [place] [address], [place]   Phone [phone]   TSH WITHOUT REFLEX - Abnormal; Notable for the following:     TSH 6.85 (*)     All other components within normal limits    Narrative:     Performed at:  [address] [place] [address] Laboratory  [address] [place], [place] [address], [place]   Phone [phone]   MICROSCOPIC URINALYSIS - Abnormal; Notable for the following:     Casts 10-20 Hyaline (*)     Mucus, UA 1+ (*)     RBC, UA 10-20 (*)     All other components within normal limits    Narrative:     Performed at:  [address] [place] [address] Laboratory  [address] [place], [place] [address], [place]   Phone [phone]   CK    Narrative:     Performed at:  [address] [place] [address] Laboratory  [address] [place], [place] [address], [place]   Phone [phone]   CK-MB    Narrative:     Performed at:  [address] [place] [address] Laboratory  [address] [place], [place] [address], [place]   Phone [phone]   TROPONIN    Narrative:     Performed at:  [address] [place] [address] Laboratory  [address] [place], [place] [address], [place]   Phone [phone]       All other labs were within normal range or not returned as of this dictation.     EMERGENCY DEPARTMENT COURSE and DIFFERENTIAL DIAGNOSIS/MDM:   Vitals:    Vitals:    09/03/18 1545 09/03/18 1600 09/03/18 1615 09/03/18 1630   BP: 121/65 121/65  133/71   Pulse: 52 52 60 55   Resp: Temp:       TempSrc:       SpO2: 95% 96% 98% 95%   Weight:       Height:           PATIENT RECHECK: 9/3/18 4:36 PM :Patient resting comfortably. He's been symptom-free entire time in the emergency room. I reviewed results of the workup with the patient and his family. CRITICAL CARE TIME   Total Critical Care time was 0 minutes, excluding separately reportable procedures. There was a high probability of clinically significant/life threatening deterioration in the patient's condition which required my urgent intervention. CONSULTS:  None    PROCEDURES:  None    FINAL IMPRESSION      1.  Near syncope          DISPOSITION/PLAN   DISPOSITION Decision To Discharge 09/03/2018 04:34:52 PM      PATIENT REFERRED TO:  Sonia Siddiqui MD  [address] [facility] [place]  [phone]    Schedule an appointment as soon as possible for a visit         DISCHARGE MEDICATIONS:  Current Discharge Medication List          (Please note that portions of this note were completed with a voice recognition program.  Efforts were made to edit the dictations but occasionally words are mis-transcribed.)    Comfort Aldridge MD (electronically signed)  Attending Emergency Physician          Comfort Aldridge MD  09/03/18 1098

## 2018-09-03 NOTE — ED NOTES
Bed: EX4  Expected date: 9/3/18  Expected time: 12:42 PM  Means of arrival: [facility]  Comments:     Nidia Antonio  09/03/18 1244

## 2018-09-03 NOTE — ED TRIAGE NOTES
Pt brought in by ems. Was on home visit and suddenly felt nauseas and lost control of bowel and bladder. nad noted on arrival to er   Pt states he feels much better. He was cleaned, changed, and repositioned prior to triage.

## 2018-09-03 NOTE — ED TRIAGE NOTES
Per family, daughter in law called [facility] and was told that after lunch pt goes into a deep sleep and is difficult to wake up. Ems states that [facility] also advised him that pt has a low heart rate.

## 2018-09-10 ENCOUNTER — OUTSIDE SERVICES (OUTPATIENT)
Dept: PRIMARY CARE CLINIC | Age: 83
End: 2018-09-10
Payer: MEDICARE

## 2018-09-10 DIAGNOSIS — N18.30 CHRONIC RENAL FAILURE, STAGE 3 (MODERATE) (HCC): ICD-10-CM

## 2018-09-10 DIAGNOSIS — I69.359 CVA, OLD, HEMIPARESIS (HCC): Primary | ICD-10-CM

## 2018-09-10 DIAGNOSIS — I10 ESSENTIAL HYPERTENSION: ICD-10-CM

## 2018-09-10 DIAGNOSIS — D64.9 ANEMIA, UNSPECIFIED TYPE: ICD-10-CM

## 2018-09-10 DIAGNOSIS — E03.9 HYPOTHYROIDISM, UNSPECIFIED TYPE: ICD-10-CM

## 2018-09-10 DIAGNOSIS — I48.91 ATRIAL FIBRILLATION, UNSPECIFIED TYPE (HCC): ICD-10-CM

## 2018-09-10 PROCEDURE — 99309 SBSQ NF CARE MODERATE MDM 30: CPT | Performed by: INTERNAL MEDICINE

## 2018-09-10 NOTE — PROGRESS NOTES
Atrial fibrillation, unspecified type ([facility] [address]  Sinus on exam. Not on anticoagulation related to prior history of hemorrhagic stroke. 4. Chronic renal failure, stage 3 (moderate)  Try to avoid any nephrotoxic agent. Make sure blood pressure and other medical problems under good control. Monitor closely specially with being on diuretics. 5. Anemia, unspecified type  Mild. Check anemia workup periodically. My related to chronic kidney disease. 6. Hypothyroidism, unspecified type  TSH is borderline elevated. Continue to monitor. Adjust Synthroid dose if needed. CMP, CBC, iron, TIBC, ferritin, TSH in 2 months.         Elias Ferrer MD on 9/10/2018 at 9:44 AM

## 2018-11-06 ENCOUNTER — OUTSIDE SERVICES (OUTPATIENT)
Dept: FAMILY MEDICINE CLINIC | Age: 83
End: 2018-11-06
Payer: MEDICARE

## 2018-11-06 DIAGNOSIS — N18.30 CHRONIC RENAL FAILURE, STAGE 3 (MODERATE) (HCC): ICD-10-CM

## 2018-11-06 DIAGNOSIS — G81.90 HEMIPARESIS, UNSPECIFIED HEMIPARESIS ETIOLOGY, UNSPECIFIED LATERALITY (HCC): Primary | ICD-10-CM

## 2018-11-06 DIAGNOSIS — I10 ESSENTIAL HYPERTENSION: ICD-10-CM

## 2018-11-06 DIAGNOSIS — E03.9 HYPOTHYROIDISM, UNSPECIFIED TYPE: ICD-10-CM

## 2018-11-06 PROCEDURE — 99309 SBSQ NF CARE MODERATE MDM 30: CPT | Performed by: NURSE PRACTITIONER

## 2018-11-06 ASSESSMENT — ENCOUNTER SYMPTOMS
VOMITING: 0
DIARRHEA: 0
NAUSEA: 0
EYE REDNESS: 0
TROUBLE SWALLOWING: 0
ABDOMINAL PAIN: 0
SHORTNESS OF BREATH: 0
SORE THROAT: 0
CHEST TIGHTNESS: 0
EYE PAIN: 0
COUGH: 0

## 2018-11-06 NOTE — PROGRESS NOTES
[address]   1935  11/06/18      CHIEF COMPLAINT:    Patient is long-term resident at the facility. Patient is due for a follow-up visit on medical problemwhich include declining functional status, HTN, A-Fib, CVA with hemiparesis, Chronic renal failure and other. HPI:   The patient is an 61-year-old  male who is on long-term resident at the [address] [place] [address]. The patient has a significant past medical history significant for chronic kidney disease, hypothyroidism, hypertension, history of CVA stroke with right-sided residual deficit, and other. Upon this initial encounter into the patient's room he is sitting upright in his wheelchair looks to be just completing his breakfast. Patient has a good appetite and he reports and is always eating pretty good. Patient is talkative, able to use his cell phone his family visits him frequently. Patient tells me a bit of history on his previous jobs where he easily of and when he moved back to [facility] where he is from. Patient was able to recall past and present histories. Patient does have right-sided hemiparesis secondary to CVA in the past. Patient looks to be managing his deficits well and compensating using his left side. Patient does have some movement in his right upper and lower extremity. Patient reports that he feels like his distress that he is getting a little bit stronger. Patient's vital signs are intact and stable today. Patient denies any new complaints at this time. Patient reports he chronically has some bilateral lower extremity edema and he does try to elevate them on his bed whenever he is in his room. Patient reports that he takes all of his current medications are prescribed to him and he has not had any side effects noted any of these medications.     Patient's past medical, surgical, social and family histories were reviewed as documented in previous Make sure BP within acceptable range. 4. Hypothyroidism, unspecified type  Chronic and stable with current medications. Continue to monitor TSH periodically and follow-up on these results. Will make adjustments to treatment plan if TSH results abnormal.    Other: See nursing home chart for details on Medication list, PRN medications and therapies. Continue with current treatment plan. Please notify Dr. Author Benson with any changes or questions with the patient functional or mental status. The patient was also examined per Dr. Danielle Cespedes. The above plan of care was reviewed and verified.     KEVIN Stubbs on 11/6/2018 at 8:36 AM

## 2018-11-09 VITALS
OXYGEN SATURATION: 98 % | TEMPERATURE: 97.9 F | DIASTOLIC BLOOD PRESSURE: 78 MMHG | RESPIRATION RATE: 18 BRPM | SYSTOLIC BLOOD PRESSURE: 134 MMHG | HEART RATE: 68 BPM

## 2018-11-09 ASSESSMENT — ENCOUNTER SYMPTOMS: BACK PAIN: 1

## 2018-11-14 ENCOUNTER — HOSPITAL ENCOUNTER (OUTPATIENT)
Facility: HOSPITAL | Age: 83
Discharge: HOME | End: 2018-11-14
Payer: MEDICARE

## 2018-11-14 LAB
A/G RATIO: 1.3 (ref 0.8–2)
ALBUMIN SERPL-MCNC: 3.3 G/DL (ref 3.4–4.8)
ALP BLD-CCNC: 89 U/L (ref 25–100)
ALT SERPL-CCNC: 21 U/L (ref 4–36)
ANION GAP SERPL CALCULATED.3IONS-SCNC: 8 MMOL/L (ref 3–16)
AST SERPL-CCNC: 23 U/L (ref 8–33)
BASOPHILS ABSOLUTE: 0.1 K/UL (ref 0–0.1)
BASOPHILS RELATIVE PERCENT: 1.4 %
BILIRUB SERPL-MCNC: 0.4 MG/DL (ref 0.3–1.2)
BUN BLDV-MCNC: 31 MG/DL (ref 6–20)
CALCIUM SERPL-MCNC: 8.7 MG/DL (ref 8.5–10.5)
CHLORIDE BLD-SCNC: 97 MMOL/L (ref 98–107)
CHOLESTEROL, TOTAL: 120 MG/DL (ref 0–200)
CO2: 34 MMOL/L (ref 20–30)
CREAT SERPL-MCNC: 1.3 MG/DL (ref 0.4–1.2)
EOSINOPHILS ABSOLUTE: 0.4 K/UL (ref 0–0.4)
EOSINOPHILS RELATIVE PERCENT: 6.7 %
FOLATE: 11.2 NG/ML
GFR AFRICAN AMERICAN: >59
GFR NON-AFRICAN AMERICAN: 53
GLOBULIN: 2.6 G/DL
GLUCOSE BLD-MCNC: 86 MG/DL (ref 74–106)
HBA1C MFR BLD: 5 %
HCT VFR BLD CALC: 34.7 % (ref 40–54)
HDLC SERPL-MCNC: 52 MG/DL (ref 40–60)
HEMOGLOBIN: 11.3 G/DL (ref 13–18)
IMMATURE GRANULOCYTES #: 0 K/UL
IMMATURE GRANULOCYTES %: 0.3 % (ref 0–5)
LDL CHOLESTEROL CALCULATED: 52 MG/DL
LYMPHOCYTES ABSOLUTE: 2.5 K/UL (ref 1.5–4)
LYMPHOCYTES RELATIVE PERCENT: 38.2 %
MCH RBC QN AUTO: 31 PG (ref 27–32)
MCHC RBC AUTO-ENTMCNC: 32.6 G/DL (ref 31–35)
MCV RBC AUTO: 95.3 FL (ref 80–100)
MONOCYTES ABSOLUTE: 0.6 K/UL (ref 0.2–0.8)
MONOCYTES RELATIVE PERCENT: 8.3 %
NEUTROPHILS ABSOLUTE: 3 K/UL (ref 2–7.5)
NEUTROPHILS RELATIVE PERCENT: 45.1 %
PDW BLD-RTO: 13.9 % (ref 11–16)
PLATELET # BLD: 196 K/UL (ref 150–400)
PMV BLD AUTO: 9.1 FL (ref 6–10)
POTASSIUM SERPL-SCNC: 3.3 MMOL/L (ref 3.4–5.1)
RBC # BLD: 3.64 M/UL (ref 4.5–6)
SODIUM BLD-SCNC: 139 MMOL/L (ref 136–145)
TOTAL PROTEIN: 5.9 G/DL (ref 6.4–8.3)
TRIGL SERPL-MCNC: 79 MG/DL (ref 0–249)
TSH SERPL DL<=0.05 MIU/L-ACNC: 5.96 UIU/ML (ref 0.35–5.5)
VITAMIN B-12: 447 PG/ML (ref 211–911)
VITAMIN D 25-HYDROXY: 26.4 (ref 32–100)
VLDLC SERPL CALC-MCNC: 16 MG/DL
WBC # BLD: 6.6 K/UL (ref 4–11)

## 2018-11-14 PROCEDURE — 82306 VITAMIN D 25 HYDROXY: CPT

## 2018-11-14 PROCEDURE — 83036 HEMOGLOBIN GLYCOSYLATED A1C: CPT

## 2018-11-14 PROCEDURE — 82746 ASSAY OF FOLIC ACID SERUM: CPT

## 2018-11-14 PROCEDURE — 85025 COMPLETE CBC W/AUTO DIFF WBC: CPT

## 2018-11-14 PROCEDURE — 82607 VITAMIN B-12: CPT

## 2018-11-14 PROCEDURE — 80061 LIPID PANEL: CPT

## 2018-11-14 PROCEDURE — 84443 ASSAY THYROID STIM HORMONE: CPT

## 2018-11-14 PROCEDURE — 80053 COMPREHEN METABOLIC PANEL: CPT

## 2019-01-01 ENCOUNTER — HOSPITAL ENCOUNTER (OUTPATIENT)
Dept: NON INVASIVE DIAGNOSTICS | Facility: HOSPITAL | Age: 84
Discharge: HOME | End: 2019-10-04
Payer: MEDICARE

## 2019-01-01 ENCOUNTER — OFFICE VISIT (OUTPATIENT)
Dept: PRIMARY CARE CLINIC | Age: 84
End: 2019-01-01
Payer: MEDICARE

## 2019-01-01 ENCOUNTER — HOSPITAL ENCOUNTER (OUTPATIENT)
Facility: HOSPITAL | Age: 84
Discharge: HOME | End: 2019-12-17
Payer: MEDICARE

## 2019-01-01 ENCOUNTER — HOSPITAL ENCOUNTER (OUTPATIENT)
Facility: HOSPITAL | Age: 84
Discharge: HOME | End: 2019-10-15
Payer: MEDICARE

## 2019-01-01 DIAGNOSIS — I48.91 ATRIAL FIBRILLATION, UNSPECIFIED TYPE (HCC): ICD-10-CM

## 2019-01-01 DIAGNOSIS — E03.9 HYPOTHYROIDISM, UNSPECIFIED TYPE: ICD-10-CM

## 2019-01-01 DIAGNOSIS — I69.359 CVA, OLD, HEMIPARESIS (HCC): ICD-10-CM

## 2019-01-01 DIAGNOSIS — N18.30 CHRONIC RENAL FAILURE, STAGE 3 (MODERATE) (HCC): ICD-10-CM

## 2019-01-01 DIAGNOSIS — I50.32 CHRONIC DIASTOLIC HEART FAILURE (HCC): Primary | ICD-10-CM

## 2019-01-01 LAB
A/G RATIO: 1.3 (ref 0.8–2)
ALBUMIN SERPL-MCNC: 4 G/DL (ref 3.4–4.8)
ALP BLD-CCNC: 116 U/L (ref 25–100)
ALT SERPL-CCNC: 24 U/L (ref 4–36)
ANION GAP SERPL CALCULATED.3IONS-SCNC: 10 MMOL/L (ref 3–16)
ANION GAP SERPL CALCULATED.3IONS-SCNC: 9 MMOL/L (ref 3–16)
AST SERPL-CCNC: 29 U/L (ref 8–33)
BASOPHILS ABSOLUTE: 0.1 K/UL (ref 0–0.1)
BASOPHILS ABSOLUTE: 0.1 K/UL (ref 0–0.1)
BASOPHILS RELATIVE PERCENT: 1.1 %
BASOPHILS RELATIVE PERCENT: 1.1 %
BILIRUB SERPL-MCNC: 0.3 MG/DL (ref 0.3–1.2)
BUN BLDV-MCNC: 28 MG/DL (ref 6–20)
BUN BLDV-MCNC: 43 MG/DL (ref 6–20)
CALCIUM SERPL-MCNC: 8.6 MG/DL (ref 8.5–10.5)
CALCIUM SERPL-MCNC: 8.8 MG/DL (ref 8.5–10.5)
CHLORIDE BLD-SCNC: 99 MMOL/L (ref 98–107)
CHLORIDE BLD-SCNC: 99 MMOL/L (ref 98–107)
CO2: 30 MMOL/L (ref 20–30)
CO2: 34 MMOL/L (ref 20–30)
CREAT SERPL-MCNC: 1.5 MG/DL (ref 0.4–1.2)
CREAT SERPL-MCNC: 1.9 MG/DL (ref 0.4–1.2)
EOSINOPHILS ABSOLUTE: 0.4 K/UL (ref 0–0.4)
EOSINOPHILS ABSOLUTE: 0.4 K/UL (ref 0–0.4)
EOSINOPHILS RELATIVE PERCENT: 5.2 %
EOSINOPHILS RELATIVE PERCENT: 5.8 %
GFR AFRICAN AMERICAN: 41
GFR AFRICAN AMERICAN: 54
GFR NON-AFRICAN AMERICAN: 34
GFR NON-AFRICAN AMERICAN: 45
GLOBULIN: 3.2 G/DL
GLUCOSE BLD-MCNC: 118 MG/DL (ref 74–106)
GLUCOSE BLD-MCNC: 81 MG/DL (ref 74–106)
HCT VFR BLD CALC: 35.5 % (ref 40–54)
HCT VFR BLD CALC: 40.8 % (ref 40–54)
HEMOGLOBIN: 12 G/DL (ref 13–18)
HEMOGLOBIN: 13.2 G/DL (ref 13–18)
IMMATURE GRANULOCYTES #: 0 K/UL
IMMATURE GRANULOCYTES #: 0 K/UL
IMMATURE GRANULOCYTES %: 0.3 % (ref 0–5)
IMMATURE GRANULOCYTES %: 0.4 % (ref 0–5)
LV EF: 58 %
LVEF MODALITY: NORMAL
LYMPHOCYTES ABSOLUTE: 2.6 K/UL (ref 1.5–4)
LYMPHOCYTES ABSOLUTE: 2.9 K/UL (ref 1.5–4)
LYMPHOCYTES RELATIVE PERCENT: 33.9 %
LYMPHOCYTES RELATIVE PERCENT: 37 %
MCH RBC QN AUTO: 31.4 PG (ref 27–32)
MCH RBC QN AUTO: 32.1 PG (ref 27–32)
MCHC RBC AUTO-ENTMCNC: 32.4 G/DL (ref 31–35)
MCHC RBC AUTO-ENTMCNC: 33.8 G/DL (ref 31–35)
MCV RBC AUTO: 94.9 FL (ref 80–100)
MCV RBC AUTO: 96.9 FL (ref 80–100)
MONOCYTES ABSOLUTE: 0.7 K/UL (ref 0.2–0.8)
MONOCYTES ABSOLUTE: 0.8 K/UL (ref 0.2–0.8)
MONOCYTES RELATIVE PERCENT: 10.3 %
MONOCYTES RELATIVE PERCENT: 9.1 %
NEUTROPHILS ABSOLUTE: 3.6 K/UL (ref 2–7.5)
NEUTROPHILS ABSOLUTE: 3.8 K/UL (ref 2–7.5)
NEUTROPHILS RELATIVE PERCENT: 46 %
NEUTROPHILS RELATIVE PERCENT: 49.8 %
PDW BLD-RTO: 13.1 % (ref 11–16)
PDW BLD-RTO: 13.8 % (ref 11–16)
PLATELET # BLD: 181 K/UL (ref 150–400)
PLATELET # BLD: 246 K/UL (ref 150–400)
PMV BLD AUTO: 9.1 FL (ref 6–10)
PMV BLD AUTO: 9.3 FL (ref 6–10)
POTASSIUM SERPL-SCNC: 3.5 MMOL/L (ref 3.4–5.1)
POTASSIUM SERPL-SCNC: 4 MMOL/L (ref 3.4–5.1)
PROSTATE SPECIFIC ANTIGEN: 2.35 NG/ML (ref 0–4)
RBC # BLD: 3.74 M/UL (ref 4.5–6)
RBC # BLD: 4.21 M/UL (ref 4.5–6)
SODIUM BLD-SCNC: 138 MMOL/L (ref 136–145)
SODIUM BLD-SCNC: 143 MMOL/L (ref 136–145)
TOTAL PROTEIN: 7.2 G/DL (ref 6.4–8.3)
WBC # BLD: 7.6 K/UL (ref 4–11)
WBC # BLD: 7.8 K/UL (ref 4–11)

## 2019-01-01 PROCEDURE — 99308 SBSQ NF CARE LOW MDM 20: CPT | Performed by: PHYSICIAN ASSISTANT

## 2019-01-01 PROCEDURE — 93306 TTE W/DOPPLER COMPLETE: CPT

## 2019-01-01 PROCEDURE — 84153 ASSAY OF PSA TOTAL: CPT

## 2019-01-01 PROCEDURE — 80048 BASIC METABOLIC PNL TOTAL CA: CPT

## 2019-01-01 PROCEDURE — 85025 COMPLETE CBC W/AUTO DIFF WBC: CPT

## 2019-01-01 PROCEDURE — 80053 COMPREHEN METABOLIC PANEL: CPT

## 2019-01-07 ENCOUNTER — OUTSIDE SERVICES (OUTPATIENT)
Dept: PRIMARY CARE CLINIC | Age: 84
End: 2019-01-07
Payer: MEDICARE

## 2019-01-07 DIAGNOSIS — R53.81 DECLINING FUNCTIONAL STATUS: Primary | ICD-10-CM

## 2019-01-07 DIAGNOSIS — I48.91 ATRIAL FIBRILLATION, UNSPECIFIED TYPE (HCC): ICD-10-CM

## 2019-01-07 DIAGNOSIS — D64.9 ANEMIA, UNSPECIFIED TYPE: ICD-10-CM

## 2019-01-07 DIAGNOSIS — I10 ESSENTIAL HYPERTENSION: ICD-10-CM

## 2019-01-07 DIAGNOSIS — I69.359 CVA, OLD, HEMIPARESIS (HCC): ICD-10-CM

## 2019-01-07 DIAGNOSIS — E03.9 HYPOTHYROIDISM, UNSPECIFIED TYPE: ICD-10-CM

## 2019-01-07 DIAGNOSIS — N18.30 CHRONIC RENAL FAILURE, STAGE 3 (MODERATE) (HCC): ICD-10-CM

## 2019-01-07 PROCEDURE — 99318 PR E/M ANNUAL NURSING FACILITY ASSESS STABLE 30 MIN: CPT | Performed by: INTERNAL MEDICINE

## 2019-01-23 ENCOUNTER — HOSPITAL ENCOUNTER (OUTPATIENT)
Facility: HOSPITAL | Age: 84
Discharge: HOME | End: 2019-01-23
Payer: MEDICARE

## 2019-01-23 LAB
BILIRUBIN URINE: NEGATIVE
BLOOD, URINE: ABNORMAL
CASTS: ABNORMAL /LPF
CLARITY: CLEAR
COLOR: YELLOW
EPITHELIAL CELLS, UA: ABNORMAL /HPF
GLUCOSE URINE: NEGATIVE MG/DL
KETONES, URINE: ABNORMAL MG/DL
LEUKOCYTE ESTERASE, URINE: NEGATIVE
MICROSCOPIC EXAMINATION: YES
MUCUS: ABNORMAL /LPF
NITRITE, URINE: NEGATIVE
PH UA: 5
PROTEIN UA: NEGATIVE MG/DL
RBC UA: ABNORMAL /HPF (ref 0–2)
SPECIFIC GRAVITY UA: 1.01
URINE REFLEX TO CULTURE: ABNORMAL
URINE TYPE: ABNORMAL
UROBILINOGEN, URINE: 0.2 E.U./DL
WBC UA: ABNORMAL /HPF (ref 0–5)

## 2019-01-23 PROCEDURE — 81001 URINALYSIS AUTO W/SCOPE: CPT

## 2019-03-11 ENCOUNTER — OFFICE VISIT (OUTPATIENT)
Dept: PRIMARY CARE CLINIC | Age: 84
End: 2019-03-11
Payer: MEDICARE

## 2019-03-11 DIAGNOSIS — N18.30 CHRONIC RENAL FAILURE, STAGE 3 (MODERATE) (HCC): ICD-10-CM

## 2019-03-11 DIAGNOSIS — E03.9 HYPOTHYROIDISM, UNSPECIFIED TYPE: ICD-10-CM

## 2019-03-11 DIAGNOSIS — I48.91 ATRIAL FIBRILLATION, UNSPECIFIED TYPE (HCC): ICD-10-CM

## 2019-03-11 DIAGNOSIS — I10 ESSENTIAL HYPERTENSION: ICD-10-CM

## 2019-03-11 DIAGNOSIS — D64.9 ANEMIA, UNSPECIFIED TYPE: ICD-10-CM

## 2019-03-11 DIAGNOSIS — I69.359 CVA, OLD, HEMIPARESIS (HCC): Primary | ICD-10-CM

## 2019-03-11 PROCEDURE — 99318 PR E/M ANNUAL NURSING FACILITY ASSESS STABLE 30 MIN: CPT | Performed by: INTERNAL MEDICINE

## 2019-03-11 NOTE — PROGRESS NOTES
[address] [place] [address] [address]    H&P    Marilu Lopez   1935 03/11/19    CHIEF COMPLAINT:    Patient is a long term resident at the facility. He is due for an updated H&P. CVA, HTN, CRF and others    HPI:   The patient is a 80-year-old male with past medical history significant for hypertension, chronic kidney disease and others who is a long-term resident at [address] [place] [address]. Patient is due for an annual H&P and follow-up visit at this time. Patient with history of chronic right-sided weakness related to prior CVA. Has some swelling in both legs that is stable. Tolerating oral intake. Reports having normal bowel movement. Patient states his wheelchair most of the time. His mobility is limited related to his prior CVA, but has been stable. Blood pressure has been stable. History of chronic renal failure. Continue to have good urine output. Also prior history of A. fib. No chest pain or palpitation. Patient has been adherent to prescribed medication regimen. No side effects reported. Medication list andallergies reviewed as documented in the paper chart of the facility.     Review of Systems  Constitutional:  Denies fever or chills, + fatigue (stable)  Eyes:  Denies eye pain or redness  HENT:  Denies nasal congestion or sore throat   Respiratory:  Denies cough or shortness of breath   Cardiovascular:  Denies chest pain, + swelling in LEs, PRIDE  GI:  Denies abdominal pain, nausea, vomiting, bloody stools or diarrhea   :  Denies dysuria or frequency  Musculoskeletal:  Denies acute neck pain or body aches  Integument:  Denies rash or itching  Neurologic:  Denies headache, dizziness, numbness, tingling, right sided weakness from prior CVA (chronic and stable)  Psychiatric:  Denies acute depression or acute anxiety    Past Medical History:   Diagnosis Date    Atrial fibrillation ([address]     Cerebral artery occlusion with cerebral infarction ([address]     Confusion     [facility]A [facility]cerebral vascular accident) ([facility] [address]     Heart murmur     Hypertension     Kidney stone     Kidney stones     Pneumonia       Social history  Patient used to smoke but quit smoking several years ago. Nondrinker and no prior history of IV or drug abuse. Has been at nursing home resident for the past few years. Family history  Noncontributory       Vital Signs    Vitals:  BP - 92/60, Temp. - 98.3, Pulse - 56, Resp. - 16       Physical Exam  Constitutional:  Well developed, well nourished, no acute distress  Eyes:  PERRL, no scleral icterus, conjunctiva normal   HENT:  Atraumatic, external ears normal, nose normal, oropharynx moist, no pharyngeal exudates. Neck- supple, no JVD, no lymphadenopathy or thyromegaly  Respiratory:  No respiratory distress, no wheezing, rales or rhonchi detected  Cardiovascular:  Normal rate, normal rhythm, systolic murmur noted, no gallops, no rubs, trace-1+ swelling in BLE R>L   GI:  Soft, nondistended, normal bowel sounds, nontender, no organomegaly noted  Musculoskeletal:  No cyanosis or obvious acute deformity. Moving all extremities   Integument:  Warm and dry. No rash noted to exposed area  Lymphatic:  No cervical or axillary lymphadenopathy noted   Neurologic:  Alert & oriented x 3, no apparent focal deficits noted, Right sided shady-paresis and mildly dysarthric speech from prior CVA noted. Psychiatric:  behavior appropriate.      Recent lab:  Lab Results   Component Value Date     11/14/2018    K 3.3 (L) 11/14/2018    CL 97 (L) 11/14/2018    CO2 34 (H) 11/14/2018    BUN 31 (H) 11/14/2018    CREATININE 1.3 (H) 11/14/2018    GLUCOSE 86 11/14/2018    CALCIUM 8.7 11/14/2018    PROT 5.9 (L) 11/14/2018    LABALBU 3.3 (L) 11/14/2018    BILITOT 0.4 11/14/2018    ALKPHOS 89 11/14/2018    AST 23 11/14/2018    ALT 21 11/14/2018    LABGLOM 53 (L) 11/14/2018    GFRAA >59 11/14/2018    AGRATIO 1.3 11/14/2018    GLOB 2.6 11/14/2018           Lab Results   Component Value Date    WBC 6.6 11/14/2018    HGB 11.3 (L) 11/14/2018    HCT 34.7 (L) 11/14/2018    MCV 95.3 11/14/2018     11/14/2018       Lab Results   Component Value Date    TSH 5.96 (H) 11/14/2018       Lab Results   Component Value Date    LABA1C 5.0 11/14/2018       ASSESSMENT/PLAN:     1. CVA, old, hemiparesis ([address]  patient with history of prior CVA and residual deficit. Mobility seems to be stable. Encouraged increased activity level. Make sure blood pressure and lipid profile under good control. Not on anticoagulation for his A. fib related to prior history of intracranial bleed. 2. Essential hypertension  borderline hypotensive. Reviewed past vitals and blood pressure seems to be running in low side. Vasotec dose was decreased. Well monitor blood pressure closely. Adjust regimen accordingly if indicated. 3. Hypothyroidism, unspecified type  continue regimen. Check TSH in the near future    4. Chronic renal failure, stage 3 (moderate) (HCC)  stable. Monitor renal function closely. Try to avoid nephrotoxic agents. 5. Anemia, unspecified type  Monitor hemoglobin level periodically. Anemia workup periodically. Try to improve other medical problems. 6. Atrial fibrillation, unspecified type (HCC)  normal sinus rhythm on exam. Continue current medications. Not on anticoagulation related to history of intracranial bleed. Proceed with CBC, CMP, TSH and fasting lipid in few days    Written by Percy Baker CMA, acting as a scribe for Dr. Kadie Villavicencio on 3/11/2019. I, Dr. Pan Marvin, personally performed the services described in the documentation as scribed by Percy Baker CMA, in my presence and it is both accurate and complete.         Pan Marvin MD on 3/11/2019

## 2019-03-12 ENCOUNTER — HOSPITAL ENCOUNTER (OUTPATIENT)
Facility: HOSPITAL | Age: 84
Discharge: HOME | End: 2019-03-12
Payer: MEDICARE

## 2019-03-12 LAB
A/G RATIO: 1.2 (ref 0.8–2)
ALBUMIN SERPL-MCNC: 3.1 G/DL (ref 3.4–4.8)
ALP BLD-CCNC: 84 U/L (ref 25–100)
ALT SERPL-CCNC: 21 U/L (ref 4–36)
ANION GAP SERPL CALCULATED.3IONS-SCNC: 10 MMOL/L (ref 3–16)
AST SERPL-CCNC: 24 U/L (ref 8–33)
BASOPHILS ABSOLUTE: 0.1 K/UL (ref 0–0.1)
BASOPHILS RELATIVE PERCENT: 1 %
BILIRUB SERPL-MCNC: 0.3 MG/DL (ref 0.3–1.2)
BUN BLDV-MCNC: 31 MG/DL (ref 6–20)
CALCIUM SERPL-MCNC: 8.4 MG/DL (ref 8.5–10.5)
CHLORIDE BLD-SCNC: 102 MMOL/L (ref 98–107)
CHOLESTEROL, TOTAL: 122 MG/DL (ref 0–200)
CO2: 30 MMOL/L (ref 20–30)
CREAT SERPL-MCNC: 1.5 MG/DL (ref 0.4–1.2)
EOSINOPHILS ABSOLUTE: 0.5 K/UL (ref 0–0.4)
EOSINOPHILS RELATIVE PERCENT: 6.8 %
GFR AFRICAN AMERICAN: 54
GFR NON-AFRICAN AMERICAN: 45
GLOBULIN: 2.5 G/DL
GLUCOSE BLD-MCNC: 78 MG/DL (ref 74–106)
HCT VFR BLD CALC: 32.5 % (ref 40–54)
HDLC SERPL-MCNC: 53 MG/DL (ref 40–60)
HEMOGLOBIN: 10.8 G/DL (ref 13–18)
IMMATURE GRANULOCYTES #: 0 K/UL
IMMATURE GRANULOCYTES %: 0.4 % (ref 0–5)
LDL CHOLESTEROL CALCULATED: 50 MG/DL
LYMPHOCYTES ABSOLUTE: 2.6 K/UL (ref 1.5–4)
LYMPHOCYTES RELATIVE PERCENT: 35.8 %
MCH RBC QN AUTO: 31.9 PG (ref 27–32)
MCHC RBC AUTO-ENTMCNC: 33.2 G/DL (ref 31–35)
MCV RBC AUTO: 95.9 FL (ref 80–100)
MONOCYTES ABSOLUTE: 0.6 K/UL (ref 0.2–0.8)
MONOCYTES RELATIVE PERCENT: 8.2 %
NEUTROPHILS ABSOLUTE: 3.4 K/UL (ref 2–7.5)
NEUTROPHILS RELATIVE PERCENT: 47.8 %
PDW BLD-RTO: 14.1 % (ref 11–16)
PLATELET # BLD: 210 K/UL (ref 150–400)
PMV BLD AUTO: 9.5 FL (ref 6–10)
POTASSIUM SERPL-SCNC: 3.4 MMOL/L (ref 3.4–5.1)
RBC # BLD: 3.39 M/UL (ref 4.5–6)
SODIUM BLD-SCNC: 142 MMOL/L (ref 136–145)
TOTAL PROTEIN: 5.6 G/DL (ref 6.4–8.3)
TRIGL SERPL-MCNC: 95 MG/DL (ref 0–249)
TSH SERPL DL<=0.05 MIU/L-ACNC: 5.57 UIU/ML (ref 0.35–5.5)
VLDLC SERPL CALC-MCNC: 19 MG/DL
WBC # BLD: 7.2 K/UL (ref 4–11)

## 2019-03-12 PROCEDURE — 80053 COMPREHEN METABOLIC PANEL: CPT

## 2019-03-12 PROCEDURE — 85025 COMPLETE CBC W/AUTO DIFF WBC: CPT

## 2019-03-12 PROCEDURE — 84443 ASSAY THYROID STIM HORMONE: CPT

## 2019-03-12 PROCEDURE — 80061 LIPID PANEL: CPT

## 2019-04-08 ENCOUNTER — HOSPITAL ENCOUNTER (OUTPATIENT)
Facility: HOSPITAL | Age: 84
Discharge: HOME | End: 2019-04-08
Payer: MEDICARE

## 2019-04-08 LAB
A/G RATIO: 1.3 (ref 0.8–2)
ALBUMIN SERPL-MCNC: 3.7 G/DL (ref 3.4–4.8)
ALP BLD-CCNC: 98 U/L (ref 25–100)
ALT SERPL-CCNC: 23 U/L (ref 4–36)
ANION GAP SERPL CALCULATED.3IONS-SCNC: 8 MMOL/L (ref 3–16)
AST SERPL-CCNC: 26 U/L (ref 8–33)
BASOPHILS ABSOLUTE: 0.1 K/UL (ref 0–0.1)
BASOPHILS RELATIVE PERCENT: 1 %
BILIRUB SERPL-MCNC: 0.3 MG/DL (ref 0.3–1.2)
BUN BLDV-MCNC: 27 MG/DL (ref 6–20)
CALCIUM SERPL-MCNC: 8.6 MG/DL (ref 8.5–10.5)
CHLORIDE BLD-SCNC: 99 MMOL/L (ref 98–107)
CO2: 32 MMOL/L (ref 20–30)
CREAT SERPL-MCNC: 1.2 MG/DL (ref 0.4–1.2)
EOSINOPHILS ABSOLUTE: 0.5 K/UL (ref 0–0.4)
EOSINOPHILS RELATIVE PERCENT: 6.9 %
FERRITIN: 160.5 NG/ML (ref 22–322)
GFR AFRICAN AMERICAN: >59
GFR NON-AFRICAN AMERICAN: 58
GLOBULIN: 2.9 G/DL
GLUCOSE BLD-MCNC: 76 MG/DL (ref 74–106)
HCT VFR BLD CALC: 38.6 % (ref 40–54)
HEMOGLOBIN: 12.6 G/DL (ref 13–18)
IMMATURE GRANULOCYTES #: 0 K/UL
IMMATURE GRANULOCYTES %: 0.3 % (ref 0–5)
IRON: 72 UG/DL (ref 59–158)
LYMPHOCYTES ABSOLUTE: 2.2 K/UL (ref 1.5–4)
LYMPHOCYTES RELATIVE PERCENT: 27.7 %
MCH RBC QN AUTO: 31.9 PG (ref 27–32)
MCHC RBC AUTO-ENTMCNC: 32.6 G/DL (ref 31–35)
MCV RBC AUTO: 97.7 FL (ref 80–100)
MONOCYTES ABSOLUTE: 0.6 K/UL (ref 0.2–0.8)
MONOCYTES RELATIVE PERCENT: 7.7 %
NEUTROPHILS ABSOLUTE: 4.5 K/UL (ref 2–7.5)
NEUTROPHILS RELATIVE PERCENT: 56.4 %
PDW BLD-RTO: 13.6 % (ref 11–16)
PLATELET # BLD: 203 K/UL (ref 150–400)
PMV BLD AUTO: 9.6 FL (ref 6–10)
POTASSIUM SERPL-SCNC: 3.3 MMOL/L (ref 3.4–5.1)
RBC # BLD: 3.95 M/UL (ref 4.5–6)
SODIUM BLD-SCNC: 139 MMOL/L (ref 136–145)
TOTAL IRON BINDING CAPACITY: 224 UG/DL (ref 250–450)
TOTAL PROTEIN: 6.6 G/DL (ref 6.4–8.3)
TSH SERPL DL<=0.05 MIU/L-ACNC: 6.69 UIU/ML (ref 0.35–5.5)
VITAMIN D 25-HYDROXY: 28.5 (ref 32–100)
WBC # BLD: 7.9 K/UL (ref 4–11)

## 2019-04-08 PROCEDURE — 85025 COMPLETE CBC W/AUTO DIFF WBC: CPT

## 2019-04-08 PROCEDURE — 83550 IRON BINDING TEST: CPT

## 2019-04-08 PROCEDURE — 84443 ASSAY THYROID STIM HORMONE: CPT

## 2019-04-08 PROCEDURE — 82728 ASSAY OF FERRITIN: CPT

## 2019-04-08 PROCEDURE — 82306 VITAMIN D 25 HYDROXY: CPT

## 2019-04-08 PROCEDURE — 83540 ASSAY OF IRON: CPT

## 2019-04-08 PROCEDURE — 80053 COMPREHEN METABOLIC PANEL: CPT

## 2019-05-13 ENCOUNTER — OFFICE VISIT (OUTPATIENT)
Dept: PRIMARY CARE CLINIC | Age: 84
End: 2019-05-13
Payer: MEDICARE

## 2019-05-13 DIAGNOSIS — E55.9 VITAMIN D DEFICIENCY: ICD-10-CM

## 2019-05-13 DIAGNOSIS — D64.9 ANEMIA, UNSPECIFIED TYPE: ICD-10-CM

## 2019-05-13 DIAGNOSIS — I69.359 CVA, OLD, HEMIPARESIS (HCC): Primary | ICD-10-CM

## 2019-05-13 DIAGNOSIS — E03.9 HYPOTHYROIDISM, UNSPECIFIED TYPE: ICD-10-CM

## 2019-05-13 DIAGNOSIS — N18.30 CHRONIC RENAL FAILURE, STAGE 3 (MODERATE) (HCC): ICD-10-CM

## 2019-05-13 DIAGNOSIS — I10 ESSENTIAL HYPERTENSION: ICD-10-CM

## 2019-05-13 DIAGNOSIS — I48.91 ATRIAL FIBRILLATION, UNSPECIFIED TYPE (HCC): ICD-10-CM

## 2019-05-13 PROCEDURE — 99308 SBSQ NF CARE LOW MDM 20: CPT | Performed by: INTERNAL MEDICINE

## 2019-05-13 PROCEDURE — 1123F ACP DISCUSS/DSCN MKR DOCD: CPT | Performed by: INTERNAL MEDICINE

## 2019-05-13 NOTE — PROGRESS NOTES
[address] [facility]   1935 05/13/19      CHIEF COMPLAINT:    Patient is long-termresident at the facility. Patient is due for a follow-up visit on medical problemwhich include: Old CVA, hypertension, CRF and others    HPI:   Mr. Eneida Hernandez is an 60-year-old male who is a long term resident at this facility. He has PMH significant for  hypertension, chronic kidney disease, CVA with residual right sided weakness, and others. Per nursing staff no acute event since time of last visit. His tsh was elevated with last lab work and synthroid dosing increased. also now on vitamin D supplementation. Vital signs have been stable. He spends most of his time in his wheelchair or recliner in his room. He likes to watch tv. Tries to keep legs elevated due to chronic swelling which is stable. Reports nasal congestion today. Per nursing staff he complained of runny nose about a week ago and was started on nasal spray for this and then complained he is congested. No sore throat, cough, chest congestion, SOA. He has been compliant with prescribed medication regimen. No side effects reported. Patient's past medical, surgical,social and family histories were reviewed as documented in previous note/chart. Medicationlist and allergies reviewed as documented in the paper chart of the facility. Review of Systems  Constitutional:  Denies fever or chills. Positive for chronic fatigue. Eyes:  Denies eye pain or redness  HENT:  Denies sore throat.  Admits to nasal congestion   Respiratory:  Denies cough or shortness of breath   Cardiovascular:  Denies chest pain,  positive for chronic swelling in BLEs and dyspnea on exertion  GI:  Denies abdominal pain, nausea, vomiting, bloody stools or diarrhea   :  Denies dysuria or frequency  Musculoskeletal:  Denies acute neck pain or body aches  Integument:  Denies rash or itching  Neurologic:  Denies headache, dizziness, numbness, tingling, right sided weakness from prior CVA (chronic and stable)  Psychiatric:  Denies acute depression or acute anxiety    Vital Signs  BP - 140/80, Temp. - 98.0, Pulse - 82, Resp. - 18     Physical Exam  Constitutional:  Well developed, well nourished, no acute distress  Eyes:  PERRL, no scleral icterus, conjunctiva normal   HENT:  Atraumatic, external ears normal, nose normal, oropharynx moist, no pharyngeal exudates. Neck- supple, no JVD, no lymphadenopathy or thyromegaly  Respiratory:  No respiratory distress, no wheezing, rales or rhonchi detected  Cardiovascular:  Normal rate, normal rhythm, systolic murmur noted, no gallops, no rubs, trace-1+ swelling in BLE   GI:  Soft, nondistended, normal bowel sounds, nontender, no organomegaly noted  Musculoskeletal:  No cyanosis or obvious acute deformity. Moving all extremities   Integument:  Warm and dry. No rash noted to exposed area   Neurologic:  Alert & oriented x 3, Right sided hemiparesis and mildly dysarthric speech from prior CVA noted. Psychiatric:  behavior appropriate. Mood stable. Pleasant. Lab Results   Component Value Date     04/08/2019    K 3.3 (L) 04/08/2019    CL 99 04/08/2019    CO2 32 (H) 04/08/2019    BUN 27 (H) 04/08/2019    CREATININE 1.2 04/08/2019    GLUCOSE 76 04/08/2019    CALCIUM 8.6 04/08/2019    PROT 6.6 04/08/2019    LABALBU 3.7 04/08/2019    BILITOT 0.3 04/08/2019    ALKPHOS 98 04/08/2019    AST 26 04/08/2019    ALT 23 04/08/2019    LABGLOM 58 (L) 04/08/2019    GFRAA >59 04/08/2019    AGRATIO 1.3 04/08/2019    GLOB 2.9 04/08/2019           Lab Results   Component Value Date    WBC 7.9 04/08/2019    HGB 12.6 (L) 04/08/2019    HCT 38.6 (L) 04/08/2019    MCV 97.7 04/08/2019     04/08/2019       Lab Results   Component Value Date    TSH 6.69 (H) 04/08/2019       Lab Results   Component Value Date    LABA1C 5.0 11/14/2018       ASSESSMENT/PLAN:     1. CVA, old, hemiparesis ([facility] [address]  Continue current regimen.  Encourage increased physical activity but patient not interested in pursuing this. Optimize BP and lipid control. Not on anticoagulation for his A. fib related to prior history of intracranial bleed. 2. Essential hypertension  BP stable on current regimen. Was borderline hypotensive at time of last visit and  Monitor and adjust regimen as needed. 3. Hypothyroidism, unspecified type  TSH increased with last labs and synthroid dose increased. Repeat tsh ordered. 4. Chronic renal failure, stage 3 (moderate) (HCC)  stable. Monitor renal function closely. Try to avoid nephrotoxic agents. Make sure blood pressure under good control. 5. Anemia, unspecified type  Monitor hemoglobin level periodically. Has been stable. Iron studies unremarkable in april. On GI ppx. 6. Atrial fibrillation, unspecified type (HCC)  normal sinus rhythm on exam. Continue current medications. Not on anticoagulation related to history of intracranial bleed. 7. Vitamin D deficiency  Started on vitamin D supplementation. Check level periodically. Written by Pascual Morejon CMA, acting as a scribe for Dr. Lucero Diaz on 5/13/2019 at 4:27 PM.       I, Dr. Price Ferraro, personally performed the services described in the documentation as scribed by Pascual Morejon CMA, in my presence and it is both accurate and complete.           Price Ferraro MD on 5/13/2019

## 2019-05-22 ENCOUNTER — HOSPITAL ENCOUNTER (OUTPATIENT)
Facility: HOSPITAL | Age: 84
Discharge: HOME | End: 2019-05-22
Payer: MEDICARE

## 2019-05-22 LAB
A/G RATIO: 1.2 (ref 0.8–2)
ALBUMIN SERPL-MCNC: 3.1 G/DL (ref 3.4–4.8)
ALP BLD-CCNC: 93 U/L (ref 25–100)
ALT SERPL-CCNC: 16 U/L (ref 4–36)
ANION GAP SERPL CALCULATED.3IONS-SCNC: 8 MMOL/L (ref 3–16)
AST SERPL-CCNC: 20 U/L (ref 8–33)
BASOPHILS ABSOLUTE: 0.1 K/UL (ref 0–0.1)
BASOPHILS RELATIVE PERCENT: 1 %
BILIRUB SERPL-MCNC: 0.3 MG/DL (ref 0.3–1.2)
BUN BLDV-MCNC: 29 MG/DL (ref 6–20)
CALCIUM SERPL-MCNC: 8.3 MG/DL (ref 8.5–10.5)
CHLORIDE BLD-SCNC: 101 MMOL/L (ref 98–107)
CO2: 30 MMOL/L (ref 20–30)
CREAT SERPL-MCNC: 1.5 MG/DL (ref 0.4–1.2)
EOSINOPHILS ABSOLUTE: 0.6 K/UL (ref 0–0.4)
EOSINOPHILS RELATIVE PERCENT: 7.2 %
GFR AFRICAN AMERICAN: 54
GFR NON-AFRICAN AMERICAN: 45
GLOBULIN: 2.5 G/DL
GLUCOSE BLD-MCNC: 81 MG/DL (ref 74–106)
HCT VFR BLD CALC: 33.6 % (ref 40–54)
HEMOGLOBIN: 11.1 G/DL (ref 13–18)
IMMATURE GRANULOCYTES #: 0 K/UL
IMMATURE GRANULOCYTES %: 0.4 % (ref 0–5)
LYMPHOCYTES ABSOLUTE: 2.6 K/UL (ref 1.5–4)
LYMPHOCYTES RELATIVE PERCENT: 33.8 %
MCH RBC QN AUTO: 31.1 PG (ref 27–32)
MCHC RBC AUTO-ENTMCNC: 33 G/DL (ref 31–35)
MCV RBC AUTO: 94.1 FL (ref 80–100)
MONOCYTES ABSOLUTE: 0.7 K/UL (ref 0.2–0.8)
MONOCYTES RELATIVE PERCENT: 8.4 %
NEUTROPHILS ABSOLUTE: 3.8 K/UL (ref 2–7.5)
NEUTROPHILS RELATIVE PERCENT: 49.2 %
PDW BLD-RTO: 13.6 % (ref 11–16)
PLATELET # BLD: 187 K/UL (ref 150–400)
PMV BLD AUTO: 9.4 FL (ref 6–10)
POTASSIUM SERPL-SCNC: 3.4 MMOL/L (ref 3.4–5.1)
RBC # BLD: 3.57 M/UL (ref 4.5–6)
SODIUM BLD-SCNC: 139 MMOL/L (ref 136–145)
TOTAL PROTEIN: 5.6 G/DL (ref 6.4–8.3)
TSH SERPL DL<=0.05 MIU/L-ACNC: 4.7 UIU/ML (ref 0.35–5.5)
WBC # BLD: 7.8 K/UL (ref 4–11)

## 2019-05-22 PROCEDURE — 84443 ASSAY THYROID STIM HORMONE: CPT

## 2019-05-22 PROCEDURE — 85025 COMPLETE CBC W/AUTO DIFF WBC: CPT

## 2019-05-22 PROCEDURE — 80053 COMPREHEN METABOLIC PANEL: CPT

## 2019-06-24 ENCOUNTER — HOSPITAL ENCOUNTER (OUTPATIENT)
Facility: HOSPITAL | Age: 84
Discharge: HOME | End: 2019-06-24
Payer: MEDICARE

## 2019-06-24 LAB
ANION GAP SERPL CALCULATED.3IONS-SCNC: 13 MMOL/L (ref 3–16)
BUN BLDV-MCNC: 36 MG/DL (ref 6–20)
CALCIUM SERPL-MCNC: 8.9 MG/DL (ref 8.5–10.5)
CHLORIDE BLD-SCNC: 96 MMOL/L (ref 98–107)
CO2: 31 MMOL/L (ref 20–30)
CREAT SERPL-MCNC: 1.5 MG/DL (ref 0.4–1.2)
GFR AFRICAN AMERICAN: 54
GFR NON-AFRICAN AMERICAN: 45
GLUCOSE BLD-MCNC: 84 MG/DL (ref 74–106)
HCT VFR BLD CALC: 41.1 % (ref 40–54)
HEMOGLOBIN: 13.1 G/DL (ref 13–18)
MCH RBC QN AUTO: 31 PG (ref 27–32)
MCHC RBC AUTO-ENTMCNC: 31.9 G/DL (ref 31–35)
MCV RBC AUTO: 97.4 FL (ref 80–100)
PDW BLD-RTO: 13.9 % (ref 11–16)
PLATELET # BLD: 214 K/UL (ref 150–400)
PMV BLD AUTO: 9.7 FL (ref 6–10)
POTASSIUM SERPL-SCNC: 3.4 MMOL/L (ref 3.4–5.1)
RBC # BLD: 4.22 M/UL (ref 4.5–6)
SODIUM BLD-SCNC: 140 MMOL/L (ref 136–145)
WBC # BLD: 8.9 K/UL (ref 4–11)

## 2019-06-24 PROCEDURE — 85027 COMPLETE CBC AUTOMATED: CPT

## 2019-06-24 PROCEDURE — 80048 BASIC METABOLIC PNL TOTAL CA: CPT

## 2019-07-15 ENCOUNTER — OFFICE VISIT (OUTPATIENT)
Dept: PRIMARY CARE CLINIC | Age: 84
End: 2019-07-15
Payer: MEDICARE

## 2019-07-15 VITALS
OXYGEN SATURATION: 97 % | RESPIRATION RATE: 18 BRPM | SYSTOLIC BLOOD PRESSURE: 136 MMHG | HEART RATE: 70 BPM | TEMPERATURE: 98.2 F | DIASTOLIC BLOOD PRESSURE: 78 MMHG

## 2019-07-15 DIAGNOSIS — I48.91 ATRIAL FIBRILLATION, UNSPECIFIED TYPE (HCC): ICD-10-CM

## 2019-07-15 DIAGNOSIS — N18.30 CHRONIC RENAL FAILURE, STAGE 3 (MODERATE) (HCC): ICD-10-CM

## 2019-07-15 DIAGNOSIS — E03.9 HYPOTHYROIDISM, UNSPECIFIED TYPE: ICD-10-CM

## 2019-07-15 DIAGNOSIS — I69.359 CVA, OLD, HEMIPARESIS (HCC): Primary | ICD-10-CM

## 2019-07-15 DIAGNOSIS — I10 ESSENTIAL HYPERTENSION: ICD-10-CM

## 2019-07-15 PROCEDURE — 1123F ACP DISCUSS/DSCN MKR DOCD: CPT | Performed by: INTERNAL MEDICINE

## 2019-07-15 PROCEDURE — 99308 SBSQ NF CARE LOW MDM 20: CPT | Performed by: INTERNAL MEDICINE

## 2019-07-15 NOTE — PROGRESS NOTES
frequency  Musculoskeletal:  Denies acute neck pain or body aches. Positive for mild arthralgia  Integument:  Denies rash or itching  Neurologic:  Denies headache, dizziness, numbness, tingling, right sided weakness from prior CVA (chronic and stable)  Psychiatric:  Denies acute depression or acute anxiety    Vital Signs  Temp: 98.2 °F (36.8 °C)  Pulse: 70  Resp: 18  BP: 136/78  SpO2: 97 %(RA)    Physical Exam  Constitutional:  Well developed, well nourished, no acute distress  Eyes:  PERRL, no scleral icterus, conjunctiva normal   HENT:  Atraumatic, external ears normal, nose normal, oropharynx moist, no pharyngeal exudates. Neck- supple, no JVD, no lymphadenopathy or thyromegaly  Respiratory:  No respiratory distress, no wheezing, rales or rhonchi detected  Cardiovascular:  Normal rate, normal rhythm, systolic murmur noted, no gallops, no rubs, trace-1+ swelling in BLE R>L. GI:  Soft, nondistended, normal bowel sounds, nontender, no organomegaly noted  Musculoskeletal:  No cyanosis or obvious acute deformity. Moving all extremities   Integument:  Warm and dry. No rash noted to exposed area   Neurologic:  Alert & oriented x 3, Right sided hemiparesis and mildly dysarthric speech from prior CVA noted. Psychiatric:  behavior appropriate. Mood stable. Pleasant.      Lab Results   Component Value Date     06/24/2019    K 3.4 06/24/2019    CL 96 (L) 06/24/2019    CO2 31 (H) 06/24/2019    BUN 36 (H) 06/24/2019    CREATININE 1.5 (H) 06/24/2019    GLUCOSE 84 06/24/2019    CALCIUM 8.9 06/24/2019    PROT 5.6 (L) 05/22/2019    LABALBU 3.1 (L) 05/22/2019    BILITOT 0.3 05/22/2019    ALKPHOS 93 05/22/2019    AST 20 05/22/2019    ALT 16 05/22/2019    LABGLOM 45 (L) 06/24/2019    GFRAA 54 (L) 06/24/2019    AGRATIO 1.2 05/22/2019    GLOB 2.5 05/22/2019           Lab Results   Component Value Date    WBC 8.9 06/24/2019    HGB 13.1 06/24/2019    HCT 41.1 06/24/2019    MCV 97.4 06/24/2019     06/24/2019       Lab

## 2019-08-13 ENCOUNTER — HOSPITAL ENCOUNTER (OUTPATIENT)
Facility: HOSPITAL | Age: 84
Discharge: HOME | End: 2019-08-13
Payer: MEDICARE

## 2019-08-13 LAB
A/G RATIO: 1.4 (ref 0.8–2)
ALBUMIN SERPL-MCNC: 3.4 G/DL (ref 3.4–4.8)
ALP BLD-CCNC: 85 U/L (ref 25–100)
ALT SERPL-CCNC: 20 U/L (ref 4–36)
ANION GAP SERPL CALCULATED.3IONS-SCNC: 10 MMOL/L (ref 3–16)
AST SERPL-CCNC: 24 U/L (ref 8–33)
BILIRUB SERPL-MCNC: 0.4 MG/DL (ref 0.3–1.2)
BUN BLDV-MCNC: 47 MG/DL (ref 6–20)
CALCIUM SERPL-MCNC: 8.8 MG/DL (ref 8.5–10.5)
CHLORIDE BLD-SCNC: 97 MMOL/L (ref 98–107)
CO2: 33 MMOL/L (ref 20–30)
CREAT SERPL-MCNC: 2 MG/DL (ref 0.4–1.2)
GFR AFRICAN AMERICAN: 39
GFR NON-AFRICAN AMERICAN: 32
GLOBULIN: 2.5 G/DL
GLUCOSE BLD-MCNC: 84 MG/DL (ref 74–106)
HCT VFR BLD CALC: 34.6 % (ref 40–54)
HEMOGLOBIN: 11.6 G/DL (ref 13–18)
MCH RBC QN AUTO: 31.4 PG (ref 27–32)
MCHC RBC AUTO-ENTMCNC: 33.5 G/DL (ref 31–35)
MCV RBC AUTO: 93.8 FL (ref 80–100)
PDW BLD-RTO: 13.6 % (ref 11–16)
PLATELET # BLD: 199 K/UL (ref 150–400)
PMV BLD AUTO: 9.4 FL (ref 6–10)
POTASSIUM SERPL-SCNC: 3.6 MMOL/L (ref 3.4–5.1)
RBC # BLD: 3.69 M/UL (ref 4.5–6)
SODIUM BLD-SCNC: 140 MMOL/L (ref 136–145)
TOTAL PROTEIN: 5.9 G/DL (ref 6.4–8.3)
TSH SERPL DL<=0.05 MIU/L-ACNC: 5.02 UIU/ML (ref 0.35–5.5)
WBC # BLD: 8.2 K/UL (ref 4–11)

## 2019-08-13 PROCEDURE — 80053 COMPREHEN METABOLIC PANEL: CPT

## 2019-08-13 PROCEDURE — 85027 COMPLETE CBC AUTOMATED: CPT

## 2019-08-13 PROCEDURE — 84443 ASSAY THYROID STIM HORMONE: CPT

## 2019-08-19 ENCOUNTER — HOSPITAL ENCOUNTER (OUTPATIENT)
Facility: HOSPITAL | Age: 84
Discharge: HOME | End: 2019-08-19
Payer: MEDICARE

## 2019-08-19 LAB
ANION GAP SERPL CALCULATED.3IONS-SCNC: 8 MMOL/L (ref 3–16)
BUN BLDV-MCNC: 36 MG/DL (ref 6–20)
CALCIUM SERPL-MCNC: 9.2 MG/DL (ref 8.5–10.5)
CHLORIDE BLD-SCNC: 98 MMOL/L (ref 98–107)
CO2: 35 MMOL/L (ref 20–30)
CREAT SERPL-MCNC: 1.7 MG/DL (ref 0.4–1.2)
GFR AFRICAN AMERICAN: 47
GFR NON-AFRICAN AMERICAN: 39
GLUCOSE BLD-MCNC: 77 MG/DL (ref 74–106)
POTASSIUM SERPL-SCNC: 3.7 MMOL/L (ref 3.4–5.1)
SODIUM BLD-SCNC: 141 MMOL/L (ref 136–145)

## 2019-08-19 PROCEDURE — 80048 BASIC METABOLIC PNL TOTAL CA: CPT

## 2019-09-16 ENCOUNTER — OFFICE VISIT (OUTPATIENT)
Dept: PRIMARY CARE CLINIC | Age: 84
End: 2019-09-16
Payer: MEDICARE

## 2019-09-16 DIAGNOSIS — R60.0 BILATERAL LOWER EXTREMITY EDEMA: ICD-10-CM

## 2019-09-16 DIAGNOSIS — E03.9 HYPOTHYROIDISM, UNSPECIFIED TYPE: ICD-10-CM

## 2019-09-16 DIAGNOSIS — I48.91 ATRIAL FIBRILLATION, UNSPECIFIED TYPE (HCC): ICD-10-CM

## 2019-09-16 DIAGNOSIS — N18.30 CHRONIC RENAL FAILURE, STAGE 3 (MODERATE) (HCC): ICD-10-CM

## 2019-09-16 DIAGNOSIS — I69.359 CVA, OLD, HEMIPARESIS (HCC): Primary | ICD-10-CM

## 2019-09-16 DIAGNOSIS — I10 ESSENTIAL HYPERTENSION: ICD-10-CM

## 2019-09-16 PROCEDURE — 99309 SBSQ NF CARE MODERATE MDM 30: CPT | Performed by: INTERNAL MEDICINE

## 2019-09-16 PROCEDURE — 1123F ACP DISCUSS/DSCN MKR DOCD: CPT | Performed by: INTERNAL MEDICINE

## 2019-09-23 VITALS
SYSTOLIC BLOOD PRESSURE: 142 MMHG | RESPIRATION RATE: 20 BRPM | TEMPERATURE: 98 F | DIASTOLIC BLOOD PRESSURE: 78 MMHG | HEART RATE: 82 BPM | OXYGEN SATURATION: 97 %

## 2019-09-23 NOTE — PROGRESS NOTES
frequency  Musculoskeletal:  Denies acute neck pain or body aches. Positive for chronic arthralgias. Integument:  Denies rash or itching  Neurologic:  Denies headache, dizziness, numbness, tingling, right sided weakness from prior CVA (chronic and stable)  Psychiatric:  Denies acute depression or acute anxiety    Vital Signs  Temp: 98 °F (36.7 °C)  Pulse: 82  Resp: 20  BP: (!) 142/78  SpO2: 97 %    Physical Exam  Constitutional:  Well developed, well nourished, no acute distress  Eyes:  PERRL, no scleral icterus, conjunctiva normal   HENT:  Atraumatic, external ears normal, nose normal, oropharynx moist, no pharyngeal exudates. Neck- supple, no JVD, no lymphadenopathy or thyromegaly  Respiratory:  No respiratory distress on room air, no wheezing, rales or rhonchi detected  Cardiovascular:  Normal rate, normal rhythm, systolic murmur noted, no gallops, no rubs, trace to +1 swelling in BLE R>L. GI:  Soft, nondistended, normal bowel sounds, nontender, no organomegaly noted  Musculoskeletal:  No cyanosis or obvious acute deformity. Moving all extremities   Integument:  Warm and dry. No rash noted to exposed area   Neurologic:  Alert & oriented x 3, Right sided hemiparesis from prior CVA, mild dysarthria noted. Psychiatric:  behavior appropriate. Mood stable. Pleasant.      Lab Results   Component Value Date     08/19/2019    K 3.7 08/19/2019    CL 98 08/19/2019    CO2 35 (H) 08/19/2019    BUN 36 (H) 08/19/2019    CREATININE 1.7 (H) 08/19/2019    GLUCOSE 77 08/19/2019    CALCIUM 9.2 08/19/2019    PROT 5.9 (L) 08/13/2019    LABALBU 3.4 08/13/2019    BILITOT 0.4 08/13/2019    ALKPHOS 85 08/13/2019    AST 24 08/13/2019    ALT 20 08/13/2019    LABGLOM 39 (L) 08/19/2019    GFRAA 47 (L) 08/19/2019    AGRATIO 1.4 08/13/2019    GLOB 2.5 08/13/2019           Lab Results   Component Value Date    WBC 8.2 08/13/2019    HGB 11.6 (L) 08/13/2019    HCT 34.6 (L) 08/13/2019    MCV 93.8 08/13/2019     08/13/2019

## 2019-10-04 ENCOUNTER — OUTSIDE FACILITY SERVICE (OUTPATIENT)
Dept: CARDIOLOGY | Facility: CLINIC | Age: 84
End: 2019-10-04

## 2019-10-04 PROCEDURE — 93308 TTE F-UP OR LMTD: CPT | Performed by: INTERNAL MEDICINE

## 2019-11-07 PROBLEM — I50.32 CHRONIC DIASTOLIC HEART FAILURE (HCC): Status: ACTIVE | Noted: 2019-01-01

## 2020-01-01 ENCOUNTER — OFFICE VISIT (OUTPATIENT)
Dept: PRIMARY CARE CLINIC | Age: 85
End: 2020-01-01
Payer: MEDICARE

## 2020-01-01 ENCOUNTER — APPOINTMENT (OUTPATIENT)
Dept: CT IMAGING | Facility: HOSPITAL | Age: 85
DRG: 206 | End: 2020-01-01
Payer: MEDICARE

## 2020-01-01 ENCOUNTER — HOSPITAL ENCOUNTER (OUTPATIENT)
Facility: HOSPITAL | Age: 85
Discharge: HOME | End: 2020-06-12
Payer: MEDICARE

## 2020-01-01 ENCOUNTER — HOSPITAL ENCOUNTER (OUTPATIENT)
Facility: HOSPITAL | Age: 85
Discharge: HOME | End: 2020-06-22
Payer: MEDICARE

## 2020-01-01 ENCOUNTER — HOSPITAL ENCOUNTER (INPATIENT)
Facility: HOSPITAL | Age: 85
LOS: 3 days | DRG: 206 | End: 2020-10-02
Attending: EMERGENCY MEDICINE | Admitting: INTERNAL MEDICINE
Payer: MEDICARE

## 2020-01-01 ENCOUNTER — HOSPITAL ENCOUNTER (OUTPATIENT)
Facility: HOSPITAL | Age: 85
Discharge: HOME | End: 2020-07-16
Payer: MEDICARE

## 2020-01-01 ENCOUNTER — HOSPITAL ENCOUNTER (OUTPATIENT)
Facility: HOSPITAL | Age: 85
Discharge: HOME | End: 2020-03-17
Payer: MEDICARE

## 2020-01-01 ENCOUNTER — HOSPITAL ENCOUNTER (OUTPATIENT)
Facility: HOSPITAL | Age: 85
Discharge: HOME | End: 2020-01-27
Payer: MEDICARE

## 2020-01-01 ENCOUNTER — APPOINTMENT (OUTPATIENT)
Dept: GENERAL RADIOLOGY | Facility: HOSPITAL | Age: 85
DRG: 206 | End: 2020-01-01
Payer: MEDICARE

## 2020-01-01 ENCOUNTER — HOSPITAL ENCOUNTER (OUTPATIENT)
Facility: HOSPITAL | Age: 85
Discharge: HOME | End: 2020-08-24
Payer: MEDICARE

## 2020-01-01 ENCOUNTER — HOSPITAL ENCOUNTER (OUTPATIENT)
Facility: HOSPITAL | Age: 85
Discharge: HOME | End: 2020-01-07
Payer: MEDICARE

## 2020-01-01 ENCOUNTER — HOSPITAL ENCOUNTER (OUTPATIENT)
Facility: HOSPITAL | Age: 85
Discharge: HOME | End: 2020-08-04
Payer: MEDICARE

## 2020-01-01 ENCOUNTER — HOSPITAL ENCOUNTER (OUTPATIENT)
Facility: HOSPITAL | Age: 85
Discharge: HOME | End: 2020-07-06
Payer: MEDICARE

## 2020-01-01 VITALS
OXYGEN SATURATION: 66 % | SYSTOLIC BLOOD PRESSURE: 61 MMHG | TEMPERATURE: 97.3 F | RESPIRATION RATE: 36 BRPM | DIASTOLIC BLOOD PRESSURE: 33 MMHG | WEIGHT: 157.7 LBS | BODY MASS INDEX: 23.9 KG/M2 | HEIGHT: 68 IN

## 2020-01-01 VITALS
SYSTOLIC BLOOD PRESSURE: 138 MMHG | HEART RATE: 76 BPM | DIASTOLIC BLOOD PRESSURE: 78 MMHG | RESPIRATION RATE: 16 BRPM | OXYGEN SATURATION: 98 % | TEMPERATURE: 97.2 F

## 2020-01-01 VITALS
DIASTOLIC BLOOD PRESSURE: 68 MMHG | TEMPERATURE: 98.3 F | OXYGEN SATURATION: 95 % | HEART RATE: 70 BPM | RESPIRATION RATE: 20 BRPM | SYSTOLIC BLOOD PRESSURE: 140 MMHG

## 2020-01-01 LAB
A/G RATIO: 0.8 (ref 0.8–2)
A/G RATIO: 0.9 (ref 0.8–2)
A/G RATIO: 1.2 (ref 0.8–2)
ALBUMIN SERPL-MCNC: 2.6 G/DL (ref 3.4–4.8)
ALBUMIN SERPL-MCNC: 2.8 G/DL (ref 3.4–4.8)
ALBUMIN SERPL-MCNC: 2.9 G/DL (ref 3.4–4.8)
ALBUMIN SERPL-MCNC: 3.1 G/DL (ref 3.4–4.8)
ALBUMIN SERPL-MCNC: 3.7 G/DL (ref 3.4–4.8)
ALP BLD-CCNC: 74 U/L (ref 25–100)
ALP BLD-CCNC: 80 U/L (ref 25–100)
ALP BLD-CCNC: 81 U/L (ref 25–100)
ALP BLD-CCNC: 83 U/L (ref 25–100)
ALP BLD-CCNC: 98 U/L (ref 25–100)
ALT SERPL-CCNC: 25 U/L (ref 4–36)
ALT SERPL-CCNC: 40 U/L (ref 4–36)
ALT SERPL-CCNC: 46 U/L (ref 4–36)
ALT SERPL-CCNC: 49 U/L (ref 4–36)
ALT SERPL-CCNC: 54 U/L (ref 4–36)
ANION GAP SERPL CALCULATED.3IONS-SCNC: 10 MMOL/L (ref 3–16)
ANION GAP SERPL CALCULATED.3IONS-SCNC: 10 MMOL/L (ref 3–16)
ANION GAP SERPL CALCULATED.3IONS-SCNC: 11 MMOL/L (ref 3–16)
ANION GAP SERPL CALCULATED.3IONS-SCNC: 12 MMOL/L (ref 3–16)
ANION GAP SERPL CALCULATED.3IONS-SCNC: 6 MMOL/L (ref 3–16)
ANION GAP SERPL CALCULATED.3IONS-SCNC: 7 MMOL/L (ref 3–16)
ANION GAP SERPL CALCULATED.3IONS-SCNC: 8 MMOL/L (ref 3–16)
ANION GAP SERPL CALCULATED.3IONS-SCNC: 9 MMOL/L (ref 3–16)
APTT: 36.8 SEC (ref 21.6–33.4)
AST SERPL-CCNC: 29 U/L (ref 8–33)
AST SERPL-CCNC: 49 U/L (ref 8–33)
AST SERPL-CCNC: 54 U/L (ref 8–33)
BACTERIA: ABNORMAL /HPF
BASOPHILS ABSOLUTE: 0 K/UL (ref 0–0.1)
BASOPHILS ABSOLUTE: 0.1 K/UL (ref 0–0.1)
BASOPHILS RELATIVE PERCENT: 0.3 %
BASOPHILS RELATIVE PERCENT: 0.8 %
BASOPHILS RELATIVE PERCENT: 0.9 %
BASOPHILS RELATIVE PERCENT: 1 %
BASOPHILS RELATIVE PERCENT: 1.1 %
BILIRUB SERPL-MCNC: 0.3 MG/DL (ref 0.3–1.2)
BILIRUB SERPL-MCNC: 0.4 MG/DL (ref 0.3–1.2)
BILIRUB SERPL-MCNC: 0.4 MG/DL (ref 0.3–1.2)
BILIRUB SERPL-MCNC: 0.6 MG/DL (ref 0.3–1.2)
BILIRUB SERPL-MCNC: 0.6 MG/DL (ref 0.3–1.2)
BILIRUBIN URINE: NEGATIVE
BLOOD, URINE: NEGATIVE
BUN BLDV-MCNC: 31 MG/DL (ref 6–20)
BUN BLDV-MCNC: 35 MG/DL (ref 6–20)
BUN BLDV-MCNC: 38 MG/DL (ref 6–20)
BUN BLDV-MCNC: 40 MG/DL (ref 6–20)
BUN BLDV-MCNC: 40 MG/DL (ref 6–20)
BUN BLDV-MCNC: 41 MG/DL (ref 6–20)
BUN BLDV-MCNC: 51 MG/DL (ref 6–20)
BUN BLDV-MCNC: 52 MG/DL (ref 6–20)
BUN BLDV-MCNC: 56 MG/DL (ref 6–20)
BUN BLDV-MCNC: 57 MG/DL (ref 6–20)
BUN BLDV-MCNC: 60 MG/DL (ref 6–20)
BUN BLDV-MCNC: 61 MG/DL (ref 6–20)
BUN BLDV-MCNC: 67 MG/DL (ref 6–20)
CALCIUM SERPL-MCNC: 7.7 MG/DL (ref 8.5–10.5)
CALCIUM SERPL-MCNC: 8.1 MG/DL (ref 8.5–10.5)
CALCIUM SERPL-MCNC: 8.4 MG/DL (ref 8.5–10.5)
CALCIUM SERPL-MCNC: 8.5 MG/DL (ref 8.5–10.5)
CALCIUM SERPL-MCNC: 8.6 MG/DL (ref 8.5–10.5)
CALCIUM SERPL-MCNC: 8.6 MG/DL (ref 8.5–10.5)
CALCIUM SERPL-MCNC: 8.7 MG/DL (ref 8.5–10.5)
CALCIUM SERPL-MCNC: 8.8 MG/DL (ref 8.5–10.5)
CALCIUM SERPL-MCNC: 8.8 MG/DL (ref 8.5–10.5)
CALCIUM SERPL-MCNC: 8.9 MG/DL (ref 8.5–10.5)
CALCIUM SERPL-MCNC: 8.9 MG/DL (ref 8.5–10.5)
CALCIUM SERPL-MCNC: 9.1 MG/DL (ref 8.5–10.5)
CALCIUM SERPL-MCNC: 9.1 MG/DL (ref 8.5–10.5)
CHLORIDE BLD-SCNC: 100 MMOL/L (ref 98–107)
CHLORIDE BLD-SCNC: 101 MMOL/L (ref 98–107)
CHLORIDE BLD-SCNC: 96 MMOL/L (ref 98–107)
CHLORIDE BLD-SCNC: 97 MMOL/L (ref 98–107)
CHLORIDE BLD-SCNC: 98 MMOL/L (ref 98–107)
CHLORIDE BLD-SCNC: 99 MMOL/L (ref 98–107)
CHOLESTEROL, TOTAL: 134 MG/DL (ref 0–200)
CLARITY: CLEAR
CO2: 22 MMOL/L (ref 20–30)
CO2: 24 MMOL/L (ref 20–30)
CO2: 24 MMOL/L (ref 20–30)
CO2: 25 MMOL/L (ref 20–30)
CO2: 26 MMOL/L (ref 20–30)
CO2: 27 MMOL/L (ref 20–30)
CO2: 27 MMOL/L (ref 20–30)
CO2: 28 MMOL/L (ref 20–30)
CO2: 29 MMOL/L (ref 20–30)
CO2: 30 MMOL/L (ref 20–30)
CO2: 31 MMOL/L (ref 20–30)
COLOR: YELLOW
CREAT SERPL-MCNC: 1.6 MG/DL (ref 0.4–1.2)
CREAT SERPL-MCNC: 1.6 MG/DL (ref 0.4–1.2)
CREAT SERPL-MCNC: 1.9 MG/DL (ref 0.4–1.2)
CREAT SERPL-MCNC: 1.9 MG/DL (ref 0.4–1.2)
CREAT SERPL-MCNC: 2 MG/DL (ref 0.4–1.2)
CREAT SERPL-MCNC: 2.1 MG/DL (ref 0.4–1.2)
CREAT SERPL-MCNC: 2.2 MG/DL (ref 0.4–1.2)
CREAT SERPL-MCNC: 2.5 MG/DL (ref 0.4–1.2)
CREAT SERPL-MCNC: 2.5 MG/DL (ref 0.4–1.2)
CREAT SERPL-MCNC: 2.6 MG/DL (ref 0.4–1.2)
CREAT SERPL-MCNC: 2.6 MG/DL (ref 0.4–1.2)
D DIMER: 2.33 UG/ML FEU (ref 0–0.6)
D DIMER: 2.72 UG/ML FEU (ref 0–0.6)
EOSINOPHILS ABSOLUTE: 0 K/UL (ref 0–0.4)
EOSINOPHILS ABSOLUTE: 0.2 K/UL (ref 0–0.4)
EOSINOPHILS ABSOLUTE: 0.3 K/UL (ref 0–0.4)
EOSINOPHILS RELATIVE PERCENT: 0 %
EOSINOPHILS RELATIVE PERCENT: 2.6 %
EOSINOPHILS RELATIVE PERCENT: 3.2 %
EOSINOPHILS RELATIVE PERCENT: 3.3 %
EOSINOPHILS RELATIVE PERCENT: 3.7 %
EPITHELIAL CELLS, UA: ABNORMAL /HPF (ref 0–5)
FERRITIN: 951 NG/ML (ref 22–322)
FINE CASTS, UA: ABNORMAL /LPF (ref 0–2)
GFR AFRICAN AMERICAN: 28
GFR AFRICAN AMERICAN: 28
GFR AFRICAN AMERICAN: 30
GFR AFRICAN AMERICAN: 30
GFR AFRICAN AMERICAN: 35
GFR AFRICAN AMERICAN: 36
GFR AFRICAN AMERICAN: 39
GFR AFRICAN AMERICAN: 41
GFR AFRICAN AMERICAN: 41
GFR AFRICAN AMERICAN: 50
GFR AFRICAN AMERICAN: 50
GFR NON-AFRICAN AMERICAN: 24
GFR NON-AFRICAN AMERICAN: 24
GFR NON-AFRICAN AMERICAN: 25
GFR NON-AFRICAN AMERICAN: 25
GFR NON-AFRICAN AMERICAN: 29
GFR NON-AFRICAN AMERICAN: 30
GFR NON-AFRICAN AMERICAN: 32
GFR NON-AFRICAN AMERICAN: 34
GFR NON-AFRICAN AMERICAN: 34
GFR NON-AFRICAN AMERICAN: 41
GFR NON-AFRICAN AMERICAN: 41
GLOBULIN: 2.9 G/DL
GLOBULIN: 3.1 G/DL
GLOBULIN: 3.3 G/DL
GLOBULIN: 3.5 G/DL
GLOBULIN: 3.7 G/DL
GLUCOSE BLD-MCNC: 106 MG/DL (ref 74–106)
GLUCOSE BLD-MCNC: 108 MG/DL (ref 74–106)
GLUCOSE BLD-MCNC: 120 MG/DL (ref 74–106)
GLUCOSE BLD-MCNC: 123 MG/DL (ref 74–106)
GLUCOSE BLD-MCNC: 135 MG/DL (ref 74–106)
GLUCOSE BLD-MCNC: 138 MG/DL (ref 74–106)
GLUCOSE BLD-MCNC: 56 MG/DL (ref 74–106)
GLUCOSE BLD-MCNC: 79 MG/DL (ref 74–106)
GLUCOSE BLD-MCNC: 79 MG/DL (ref 74–106)
GLUCOSE BLD-MCNC: 81 MG/DL (ref 74–106)
GLUCOSE BLD-MCNC: 83 MG/DL (ref 74–106)
GLUCOSE BLD-MCNC: 84 MG/DL (ref 74–106)
GLUCOSE BLD-MCNC: 98 MG/DL (ref 74–106)
GLUCOSE URINE: NEGATIVE MG/DL
HCT VFR BLD CALC: 29.1 % (ref 40–54)
HCT VFR BLD CALC: 31 % (ref 40–54)
HCT VFR BLD CALC: 31.6 % (ref 40–54)
HCT VFR BLD CALC: 32.3 % (ref 40–54)
HCT VFR BLD CALC: 32.5 % (ref 40–54)
HCT VFR BLD CALC: 33.1 % (ref 40–54)
HCT VFR BLD CALC: 33.5 % (ref 40–54)
HCT VFR BLD CALC: 33.9 % (ref 40–54)
HCT VFR BLD CALC: 34.3 % (ref 40–54)
HCT VFR BLD CALC: 34.9 % (ref 40–54)
HDLC SERPL-MCNC: 41 MG/DL (ref 40–60)
HEMOGLOBIN: 10 G/DL (ref 13–18)
HEMOGLOBIN: 10.4 G/DL (ref 13–18)
HEMOGLOBIN: 10.5 G/DL (ref 13–18)
HEMOGLOBIN: 10.8 G/DL (ref 13–18)
HEMOGLOBIN: 11 G/DL (ref 13–18)
HEMOGLOBIN: 11.1 G/DL (ref 13–18)
HEMOGLOBIN: 11.2 G/DL (ref 13–18)
HEMOGLOBIN: 11.6 G/DL (ref 13–18)
HEMOGLOBIN: 9.3 G/DL (ref 13–18)
HEMOGLOBIN: 9.8 G/DL (ref 13–18)
HYALINE CASTS: ABNORMAL /LPF (ref 0–2)
IMMATURE GRANULOCYTES #: 0 K/UL
IMMATURE GRANULOCYTES #: 0.1 K/UL
IMMATURE GRANULOCYTES #: 0.1 K/UL
IMMATURE GRANULOCYTES %: 0.4 % (ref 0–5)
IMMATURE GRANULOCYTES %: 0.4 % (ref 0–5)
IMMATURE GRANULOCYTES %: 0.5 % (ref 0–5)
IMMATURE GRANULOCYTES %: 0.7 % (ref 0–5)
IMMATURE GRANULOCYTES %: 1.5 % (ref 0–5)
INR BLD: 1.12 (ref 0.87–1.14)
INR BLD: 1.15 (ref 0.87–1.14)
INR BLD: 1.22 (ref 0.87–1.14)
INR BLD: 1.3 (ref 0.87–1.14)
KETONES, URINE: NEGATIVE MG/DL
LACTATE DEHYDROGENASE: 350 U/L (ref 100–190)
LACTIC ACID: 1.8 MMOL/L (ref 0.4–2)
LDL CHOLESTEROL CALCULATED: 62 MG/DL
LEUKOCYTE ESTERASE, URINE: NEGATIVE
LYMPHOCYTES ABSOLUTE: 0.9 K/UL (ref 1.5–4)
LYMPHOCYTES ABSOLUTE: 2.3 K/UL (ref 1.5–4)
LYMPHOCYTES ABSOLUTE: 2.5 K/UL (ref 1.5–4)
LYMPHOCYTES ABSOLUTE: 2.7 K/UL (ref 1.5–4)
LYMPHOCYTES ABSOLUTE: 3.5 K/UL (ref 1.5–4)
LYMPHOCYTES RELATIVE PERCENT: 32.7 %
LYMPHOCYTES RELATIVE PERCENT: 33.2 %
LYMPHOCYTES RELATIVE PERCENT: 34.1 %
LYMPHOCYTES RELATIVE PERCENT: 38.5 %
LYMPHOCYTES RELATIVE PERCENT: 8.2 %
MAGNESIUM: 2.1 MG/DL (ref 1.7–2.4)
MCH RBC QN AUTO: 30.7 PG (ref 27–32)
MCH RBC QN AUTO: 30.7 PG (ref 27–32)
MCH RBC QN AUTO: 31.1 PG (ref 27–32)
MCH RBC QN AUTO: 31.3 PG (ref 27–32)
MCH RBC QN AUTO: 31.4 PG (ref 27–32)
MCH RBC QN AUTO: 31.6 PG (ref 27–32)
MCH RBC QN AUTO: 31.7 PG (ref 27–32)
MCH RBC QN AUTO: 31.8 PG (ref 27–32)
MCH RBC QN AUTO: 31.8 PG (ref 27–32)
MCH RBC QN AUTO: 32.1 PG (ref 27–32)
MCHC RBC AUTO-ENTMCNC: 31 G/DL (ref 31–35)
MCHC RBC AUTO-ENTMCNC: 31.6 G/DL (ref 31–35)
MCHC RBC AUTO-ENTMCNC: 31.7 G/DL (ref 31–35)
MCHC RBC AUTO-ENTMCNC: 32 G/DL (ref 31–35)
MCHC RBC AUTO-ENTMCNC: 32.4 G/DL (ref 31–35)
MCHC RBC AUTO-ENTMCNC: 32.8 G/DL (ref 31–35)
MCHC RBC AUTO-ENTMCNC: 32.9 G/DL (ref 31–35)
MCHC RBC AUTO-ENTMCNC: 33 G/DL (ref 31–35)
MCHC RBC AUTO-ENTMCNC: 33.2 G/DL (ref 31–35)
MCHC RBC AUTO-ENTMCNC: 33.2 G/DL (ref 31–35)
MCV RBC AUTO: 95 FL (ref 80–100)
MCV RBC AUTO: 95.7 FL (ref 80–100)
MCV RBC AUTO: 96.3 FL (ref 80–100)
MCV RBC AUTO: 96.6 FL (ref 80–100)
MCV RBC AUTO: 96.7 FL (ref 80–100)
MCV RBC AUTO: 97.2 FL (ref 80–100)
MCV RBC AUTO: 97.3 FL (ref 80–100)
MCV RBC AUTO: 98 FL (ref 80–100)
MCV RBC AUTO: 98.5 FL (ref 80–100)
MCV RBC AUTO: 99.1 FL (ref 80–100)
MICROSCOPIC EXAMINATION: YES
MONOCYTES ABSOLUTE: 0.6 K/UL (ref 0.2–0.8)
MONOCYTES ABSOLUTE: 0.8 K/UL (ref 0.2–0.8)
MONOCYTES RELATIVE PERCENT: 5.6 %
MONOCYTES RELATIVE PERCENT: 7.9 %
MONOCYTES RELATIVE PERCENT: 8 %
MONOCYTES RELATIVE PERCENT: 8.3 %
MONOCYTES RELATIVE PERCENT: 8.9 %
MUCUS: ABNORMAL /LPF
NEUTROPHILS ABSOLUTE: 3.7 K/UL (ref 2–7.5)
NEUTROPHILS ABSOLUTE: 4.2 K/UL (ref 2–7.5)
NEUTROPHILS ABSOLUTE: 4.2 K/UL (ref 2–7.5)
NEUTROPHILS ABSOLUTE: 4.3 K/UL (ref 2–7.5)
NEUTROPHILS ABSOLUTE: 9 K/UL (ref 2–7.5)
NEUTROPHILS RELATIVE PERCENT: 47.4 %
NEUTROPHILS RELATIVE PERCENT: 52.7 %
NEUTROPHILS RELATIVE PERCENT: 53.9 %
NEUTROPHILS RELATIVE PERCENT: 55 %
NEUTROPHILS RELATIVE PERCENT: 85.2 %
NITRITE, URINE: NEGATIVE
PDW BLD-RTO: 13 % (ref 11–16)
PDW BLD-RTO: 13.2 % (ref 11–16)
PDW BLD-RTO: 13.3 % (ref 11–16)
PDW BLD-RTO: 13.4 % (ref 11–16)
PDW BLD-RTO: 13.4 % (ref 11–16)
PDW BLD-RTO: 13.7 % (ref 11–16)
PDW BLD-RTO: 13.7 % (ref 11–16)
PDW BLD-RTO: 13.8 % (ref 11–16)
PH UA: 5 (ref 5–8)
PLATELET # BLD: 186 K/UL (ref 150–400)
PLATELET # BLD: 197 K/UL (ref 150–400)
PLATELET # BLD: 205 K/UL (ref 150–400)
PLATELET # BLD: 217 K/UL (ref 150–400)
PLATELET # BLD: 232 K/UL (ref 150–400)
PLATELET # BLD: 253 K/UL (ref 150–400)
PLATELET # BLD: 269 K/UL (ref 150–400)
PLATELET # BLD: 300 K/UL (ref 150–400)
PLATELET # BLD: 317 K/UL (ref 150–400)
PLATELET # BLD: 329 K/UL (ref 150–400)
PMV BLD AUTO: 8.8 FL (ref 6–10)
PMV BLD AUTO: 8.9 FL (ref 6–10)
PMV BLD AUTO: 9 FL (ref 6–10)
PMV BLD AUTO: 9.1 FL (ref 6–10)
PMV BLD AUTO: 9.1 FL (ref 6–10)
PMV BLD AUTO: 9.2 FL (ref 6–10)
PMV BLD AUTO: 9.3 FL (ref 6–10)
PMV BLD AUTO: 9.4 FL (ref 6–10)
POTASSIUM REFLEX MAGNESIUM: 3.5 MMOL/L (ref 3.4–5.1)
POTASSIUM REFLEX MAGNESIUM: 3.6 MMOL/L (ref 3.4–5.1)
POTASSIUM REFLEX MAGNESIUM: 4.2 MMOL/L (ref 3.4–5.1)
POTASSIUM REFLEX MAGNESIUM: 4.3 MMOL/L (ref 3.4–5.1)
POTASSIUM SERPL-SCNC: 4.3 MMOL/L (ref 3.4–5.1)
POTASSIUM SERPL-SCNC: 4.6 MMOL/L (ref 3.4–5.1)
POTASSIUM SERPL-SCNC: 4.7 MMOL/L (ref 3.4–5.1)
POTASSIUM SERPL-SCNC: 4.8 MMOL/L (ref 3.4–5.1)
POTASSIUM SERPL-SCNC: 5.4 MMOL/L (ref 3.4–5.1)
PRO-BNP: 6878 PG/ML (ref 0–1800)
PROTEIN UA: ABNORMAL MG/DL
PROTHROMBIN TIME: 14.3 SEC (ref 11.7–14.6)
PROTHROMBIN TIME: 14.7 SEC (ref 11.7–14.6)
PROTHROMBIN TIME: 15.4 SEC (ref 11.7–14.6)
PROTHROMBIN TIME: 16.2 SEC (ref 11.7–14.6)
RBC # BLD: 2.99 M/UL (ref 4.5–6)
RBC # BLD: 3.19 M/UL (ref 4.5–6)
RBC # BLD: 3.26 M/UL (ref 4.5–6)
RBC # BLD: 3.27 M/UL (ref 4.5–6)
RBC # BLD: 3.36 M/UL (ref 4.5–6)
RBC # BLD: 3.42 M/UL (ref 4.5–6)
RBC # BLD: 3.5 M/UL (ref 4.5–6)
RBC # BLD: 3.5 M/UL (ref 4.5–6)
RBC # BLD: 3.52 M/UL (ref 4.5–6)
RBC # BLD: 3.61 M/UL (ref 4.5–6)
RBC UA: ABNORMAL /HPF (ref 0–4)
REASON FOR REJECTION: NORMAL
REJECTED TEST: NORMAL
SODIUM BLD-SCNC: 132 MMOL/L (ref 136–145)
SODIUM BLD-SCNC: 135 MMOL/L (ref 136–145)
SODIUM BLD-SCNC: 136 MMOL/L (ref 136–145)
SODIUM BLD-SCNC: 137 MMOL/L (ref 136–145)
SODIUM BLD-SCNC: 137 MMOL/L (ref 136–145)
SODIUM BLD-SCNC: 139 MMOL/L (ref 136–145)
SODIUM BLD-SCNC: 140 MMOL/L (ref 136–145)
SPECIFIC GRAVITY UA: >=1.03 (ref 1–1.03)
TOTAL PROTEIN: 5.5 G/DL (ref 6.4–8.3)
TOTAL PROTEIN: 6.2 G/DL (ref 6.4–8.3)
TOTAL PROTEIN: 6.5 G/DL (ref 6.4–8.3)
TOTAL PROTEIN: 6.6 G/DL (ref 6.4–8.3)
TOTAL PROTEIN: 6.8 G/DL (ref 6.4–8.3)
TRIGL SERPL-MCNC: 156 MG/DL (ref 0–249)
TROPONIN: <0.3 NG/ML
TSH SERPL DL<=0.05 MIU/L-ACNC: 2.55 UIU/ML (ref 0.35–5.5)
TSH SERPL DL<=0.05 MIU/L-ACNC: 6.59 UIU/ML (ref 0.35–5.5)
URINE REFLEX TO CULTURE: ABNORMAL
URINE TYPE: ABNORMAL
UROBILINOGEN, URINE: 0.2 E.U./DL
VLDLC SERPL CALC-MCNC: 31 MG/DL
WBC # BLD: 10.6 K/UL (ref 4–11)
WBC # BLD: 10.7 K/UL (ref 4–11)
WBC # BLD: 12.7 K/UL (ref 4–11)
WBC # BLD: 13 K/UL (ref 4–11)
WBC # BLD: 6.9 K/UL (ref 4–11)
WBC # BLD: 7.1 K/UL (ref 4–11)
WBC # BLD: 7.6 K/UL (ref 4–11)
WBC # BLD: 7.9 K/UL (ref 4–11)
WBC # BLD: 8.9 K/UL (ref 4–11)
WBC # BLD: 9.2 K/UL (ref 4–11)
WBC UA: ABNORMAL /HPF (ref 0–5)

## 2020-01-01 PROCEDURE — 80048 BASIC METABOLIC PNL TOTAL CA: CPT

## 2020-01-01 PROCEDURE — 99308 SBSQ NF CARE LOW MDM 20: CPT | Performed by: PHYSICIAN ASSISTANT

## 2020-01-01 PROCEDURE — 36415 COLL VENOUS BLD VENIPUNCTURE: CPT

## 2020-01-01 PROCEDURE — 6360000002 HC RX W HCPCS: Performed by: EMERGENCY MEDICINE

## 2020-01-01 PROCEDURE — 93005 ELECTROCARDIOGRAM TRACING: CPT

## 2020-01-01 PROCEDURE — 85610 PROTHROMBIN TIME: CPT

## 2020-01-01 PROCEDURE — 94640 AIRWAY INHALATION TREATMENT: CPT

## 2020-01-01 PROCEDURE — 81001 URINALYSIS AUTO W/SCOPE: CPT

## 2020-01-01 PROCEDURE — 83605 ASSAY OF LACTIC ACID: CPT

## 2020-01-01 PROCEDURE — 2580000003 HC RX 258: Performed by: PHYSICIAN ASSISTANT

## 2020-01-01 PROCEDURE — 84443 ASSAY THYROID STIM HORMONE: CPT

## 2020-01-01 PROCEDURE — 96374 THER/PROPH/DIAG INJ IV PUSH: CPT

## 2020-01-01 PROCEDURE — 6360000002 HC RX W HCPCS: Performed by: PHYSICIAN ASSISTANT

## 2020-01-01 PROCEDURE — 6370000000 HC RX 637 (ALT 250 FOR IP): Performed by: PHYSICIAN ASSISTANT

## 2020-01-01 PROCEDURE — 80061 LIPID PANEL: CPT

## 2020-01-01 PROCEDURE — 2500000003 HC RX 250 WO HCPCS: Performed by: PHYSICIAN ASSISTANT

## 2020-01-01 PROCEDURE — 70450 CT HEAD/BRAIN W/O DYE: CPT

## 2020-01-01 PROCEDURE — 85730 THROMBOPLASTIN TIME PARTIAL: CPT

## 2020-01-01 PROCEDURE — 94761 N-INVAS EAR/PLS OXIMETRY MLT: CPT

## 2020-01-01 PROCEDURE — 1123F ACP DISCUSS/DSCN MKR DOCD: CPT | Performed by: INTERNAL MEDICINE

## 2020-01-01 PROCEDURE — 84484 ASSAY OF TROPONIN QUANT: CPT

## 2020-01-01 PROCEDURE — XW033E5 INTRODUCTION OF REMDESIVIR ANTI-INFECTIVE INTO PERIPHERAL VEIN, PERCUTANEOUS APPROACH, NEW TECHNOLOGY GROUP 5: ICD-10-PCS | Performed by: INTERNAL MEDICINE

## 2020-01-01 PROCEDURE — 2700000000 HC OXYGEN THERAPY PER DAY

## 2020-01-01 PROCEDURE — 99318 PR E/M ANNUAL NURSING FACILITY ASSESS STABLE 30 MIN: CPT | Performed by: INTERNAL MEDICINE

## 2020-01-01 PROCEDURE — 94660 CPAP INITIATION&MGMT: CPT

## 2020-01-01 PROCEDURE — 87040 BLOOD CULTURE FOR BACTERIA: CPT

## 2020-01-01 PROCEDURE — 6360000002 HC RX W HCPCS: Performed by: INTERNAL MEDICINE

## 2020-01-01 PROCEDURE — 80053 COMPREHEN METABOLIC PANEL: CPT

## 2020-01-01 PROCEDURE — 83735 ASSAY OF MAGNESIUM: CPT

## 2020-01-01 PROCEDURE — 1200000000 HC SEMI PRIVATE

## 2020-01-01 PROCEDURE — 83615 LACTATE (LD) (LDH) ENZYME: CPT

## 2020-01-01 PROCEDURE — 85027 COMPLETE CBC AUTOMATED: CPT

## 2020-01-01 PROCEDURE — 99223 1ST HOSP IP/OBS HIGH 75: CPT | Performed by: INTERNAL MEDICINE

## 2020-01-01 PROCEDURE — 6360000002 HC RX W HCPCS

## 2020-01-01 PROCEDURE — 82728 ASSAY OF FERRITIN: CPT

## 2020-01-01 PROCEDURE — 85025 COMPLETE CBC W/AUTO DIFF WBC: CPT

## 2020-01-01 PROCEDURE — 99283 EMERGENCY DEPT VISIT LOW MDM: CPT

## 2020-01-01 PROCEDURE — 99232 SBSQ HOSP IP/OBS MODERATE 35: CPT | Performed by: INTERNAL MEDICINE

## 2020-01-01 PROCEDURE — 71045 X-RAY EXAM CHEST 1 VIEW: CPT

## 2020-01-01 PROCEDURE — 83880 ASSAY OF NATRIURETIC PEPTIDE: CPT

## 2020-01-01 PROCEDURE — 85379 FIBRIN DEGRADATION QUANT: CPT

## 2020-01-01 PROCEDURE — 99309 SBSQ NF CARE MODERATE MDM 30: CPT | Performed by: INTERNAL MEDICINE

## 2020-01-01 PROCEDURE — 85384 FIBRINOGEN ACTIVITY: CPT

## 2020-01-01 RX ORDER — FAMOTIDINE 20 MG/1
20 TABLET, FILM COATED ORAL 2 TIMES DAILY
Status: DISCONTINUED | OUTPATIENT
Start: 2020-01-01 | End: 2020-01-01 | Stop reason: CLARIF

## 2020-01-01 RX ORDER — AZITHROMYCIN 500 MG/1
500 TABLET, FILM COATED ORAL NIGHTLY
COMMUNITY
Start: 2020-01-01 | End: 2020-01-01

## 2020-01-01 RX ORDER — ENALAPRIL MALEATE 5 MG/1
10 TABLET ORAL DAILY
Status: DISCONTINUED | OUTPATIENT
Start: 2020-01-01 | End: 2020-01-01 | Stop reason: ALTCHOICE

## 2020-01-01 RX ORDER — IPRATROPIUM BROMIDE AND ALBUTEROL SULFATE 2.5; .5 MG/3ML; MG/3ML
1 SOLUTION RESPIRATORY (INHALATION) EVERY 6 HOURS PRN
COMMUNITY

## 2020-01-01 RX ORDER — ACETAMINOPHEN 325 MG/1
650 TABLET ORAL EVERY 6 HOURS PRN
Status: DISCONTINUED | OUTPATIENT
Start: 2020-01-01 | End: 2020-01-01 | Stop reason: HOSPADM

## 2020-01-01 RX ORDER — LORAZEPAM 2 MG/ML
0.5 INJECTION INTRAMUSCULAR
Status: DISCONTINUED | OUTPATIENT
Start: 2020-01-01 | End: 2020-01-01 | Stop reason: HOSPADM

## 2020-01-01 RX ORDER — IPRATROPIUM BROMIDE AND ALBUTEROL SULFATE 2.5; .5 MG/3ML; MG/3ML
1 SOLUTION RESPIRATORY (INHALATION) EVERY 4 HOURS PRN
Status: DISCONTINUED | OUTPATIENT
Start: 2020-01-01 | End: 2020-01-01 | Stop reason: HOSPADM

## 2020-01-01 RX ORDER — POLYETHYLENE GLYCOL 3350 17 G/17G
17 POWDER, FOR SOLUTION ORAL DAILY PRN
Status: DISCONTINUED | OUTPATIENT
Start: 2020-01-01 | End: 2020-01-01 | Stop reason: ALTCHOICE

## 2020-01-01 RX ORDER — ALBUTEROL SULFATE 90 UG/1
2 AEROSOL, METERED RESPIRATORY (INHALATION) EVERY 4 HOURS PRN
Status: DISCONTINUED | OUTPATIENT
Start: 2020-01-01 | End: 2020-01-01 | Stop reason: HOSPADM

## 2020-01-01 RX ORDER — MAGNESIUM OXIDE 400 MG/1
400 TABLET ORAL 2 TIMES DAILY
COMMUNITY
Start: 2020-01-01 | End: 2020-10-24

## 2020-01-01 RX ORDER — LORAZEPAM 2 MG/ML
INJECTION INTRAMUSCULAR
Status: COMPLETED
Start: 2020-01-01 | End: 2020-01-01

## 2020-01-01 RX ORDER — POLYETHYLENE GLYCOL 3350 17 G/17G
17 POWDER, FOR SOLUTION ORAL DAILY PRN
Status: DISCONTINUED | OUTPATIENT
Start: 2020-01-01 | End: 2020-01-01 | Stop reason: HOSPADM

## 2020-01-01 RX ORDER — FINASTERIDE 5 MG/1
5 TABLET, FILM COATED ORAL DAILY
COMMUNITY

## 2020-01-01 RX ORDER — ACETAMINOPHEN 650 MG/1
650 SUPPOSITORY RECTAL EVERY 6 HOURS PRN
Status: DISCONTINUED | OUTPATIENT
Start: 2020-01-01 | End: 2020-01-01 | Stop reason: HOSPADM

## 2020-01-01 RX ORDER — ASCORBIC ACID 500 MG
500 TABLET ORAL DAILY
COMMUNITY
Start: 2020-01-01 | End: 2020-10-24

## 2020-01-01 RX ORDER — FUROSEMIDE 10 MG/ML
20 INJECTION INTRAMUSCULAR; INTRAVENOUS ONCE
Status: COMPLETED | OUTPATIENT
Start: 2020-01-01 | End: 2020-01-01

## 2020-01-01 RX ORDER — SODIUM CHLORIDE 9 MG/ML
INJECTION, SOLUTION INTRAVENOUS CONTINUOUS
Status: DISCONTINUED | OUTPATIENT
Start: 2020-01-01 | End: 2020-01-01 | Stop reason: HOSPADM

## 2020-01-01 RX ORDER — FINASTERIDE 5 MG/1
5 TABLET, FILM COATED ORAL DAILY
Status: DISCONTINUED | OUTPATIENT
Start: 2020-01-01 | End: 2020-01-01 | Stop reason: HOSPADM

## 2020-01-01 RX ORDER — FLUTICASONE PROPIONATE 50 MCG
2 SPRAY, SUSPENSION (ML) NASAL DAILY
Status: DISCONTINUED | OUTPATIENT
Start: 2020-01-01 | End: 2020-01-01 | Stop reason: HOSPADM

## 2020-01-01 RX ORDER — ACETAMINOPHEN 325 MG/1
650 TABLET ORAL EVERY 4 HOURS PRN
Status: DISCONTINUED | OUTPATIENT
Start: 2020-01-01 | End: 2020-01-01 | Stop reason: ALTCHOICE

## 2020-01-01 RX ORDER — FUROSEMIDE 20 MG/1
20 TABLET ORAL DAILY
Status: DISCONTINUED | OUTPATIENT
Start: 2020-01-01 | End: 2020-01-01 | Stop reason: HOSPADM

## 2020-01-01 RX ORDER — FUROSEMIDE 20 MG/1
20 TABLET ORAL EVERY MORNING
COMMUNITY

## 2020-01-01 RX ORDER — SPIRONOLACTONE 25 MG/1
25 TABLET ORAL
COMMUNITY

## 2020-01-01 RX ORDER — ASPIRIN 81 MG/1
81 TABLET ORAL DAILY
COMMUNITY

## 2020-01-01 RX ORDER — PANTOPRAZOLE SODIUM 40 MG/1
40 TABLET, DELAYED RELEASE ORAL NIGHTLY
COMMUNITY

## 2020-01-01 RX ORDER — METOPROLOL SUCCINATE 50 MG/1
50 TABLET, EXTENDED RELEASE ORAL NIGHTLY
Status: DISCONTINUED | OUTPATIENT
Start: 2020-01-01 | End: 2020-01-01 | Stop reason: HOSPADM

## 2020-01-01 RX ORDER — GUAIFENESIN 600 MG/1
1200 TABLET, EXTENDED RELEASE ORAL 2 TIMES DAILY PRN
Status: DISCONTINUED | OUTPATIENT
Start: 2020-01-01 | End: 2020-01-01 | Stop reason: HOSPADM

## 2020-01-01 RX ORDER — LEVOTHYROXINE SODIUM 88 UG/1
88 TABLET ORAL DAILY
Status: DISCONTINUED | OUTPATIENT
Start: 2020-01-01 | End: 2020-01-01 | Stop reason: HOSPADM

## 2020-01-01 RX ORDER — ALBUTEROL SULFATE 90 UG/1
2 AEROSOL, METERED RESPIRATORY (INHALATION) EVERY 6 HOURS PRN
Status: DISCONTINUED | OUTPATIENT
Start: 2020-01-01 | End: 2020-01-01

## 2020-01-01 RX ORDER — AMIODARONE HYDROCHLORIDE 200 MG/1
200 TABLET ORAL DAILY
Status: DISCONTINUED | OUTPATIENT
Start: 2020-01-01 | End: 2020-01-01 | Stop reason: HOSPADM

## 2020-01-01 RX ORDER — ACETAMINOPHEN 325 MG/1
650 TABLET ORAL EVERY 4 HOURS PRN
COMMUNITY

## 2020-01-01 RX ORDER — DEXAMETHASONE SODIUM PHOSPHATE 10 MG/ML
10 INJECTION INTRAMUSCULAR; INTRAVENOUS ONCE
Status: COMPLETED | OUTPATIENT
Start: 2020-01-01 | End: 2020-01-01

## 2020-01-01 RX ORDER — PANTOPRAZOLE SODIUM 40 MG/1
40 TABLET, DELAYED RELEASE ORAL DAILY
Status: DISCONTINUED | OUTPATIENT
Start: 2020-01-01 | End: 2020-01-01 | Stop reason: HOSPADM

## 2020-01-01 RX ORDER — DOCUSATE SODIUM 100 MG/1
100 CAPSULE, LIQUID FILLED ORAL DAILY
Status: DISCONTINUED | OUTPATIENT
Start: 2020-01-01 | End: 2020-01-01 | Stop reason: HOSPADM

## 2020-01-01 RX ORDER — CETIRIZINE HYDROCHLORIDE 10 MG/1
5 TABLET ORAL DAILY
Status: DISCONTINUED | OUTPATIENT
Start: 2020-01-01 | End: 2020-01-01 | Stop reason: HOSPADM

## 2020-01-01 RX ORDER — PROMETHAZINE HYDROCHLORIDE 25 MG/1
12.5 TABLET ORAL EVERY 6 HOURS PRN
Status: DISCONTINUED | OUTPATIENT
Start: 2020-01-01 | End: 2020-01-01 | Stop reason: HOSPADM

## 2020-01-01 RX ORDER — MONTELUKAST SODIUM 10 MG/1
10 TABLET ORAL NIGHTLY
Status: DISCONTINUED | OUTPATIENT
Start: 2020-01-01 | End: 2020-01-01 | Stop reason: HOSPADM

## 2020-01-01 RX ORDER — IPRATROPIUM BROMIDE AND ALBUTEROL SULFATE 2.5; .5 MG/3ML; MG/3ML
1 SOLUTION RESPIRATORY (INHALATION) EVERY 6 HOURS PRN
Status: DISCONTINUED | OUTPATIENT
Start: 2020-01-01 | End: 2020-01-01

## 2020-01-01 RX ORDER — TAMSULOSIN HYDROCHLORIDE 0.4 MG/1
0.4 CAPSULE ORAL DAILY
Status: DISCONTINUED | OUTPATIENT
Start: 2020-01-01 | End: 2020-01-01 | Stop reason: HOSPADM

## 2020-01-01 RX ORDER — ONDANSETRON 4 MG/1
4 TABLET, ORALLY DISINTEGRATING ORAL EVERY 8 HOURS PRN
Status: DISCONTINUED | OUTPATIENT
Start: 2020-01-01 | End: 2020-01-01 | Stop reason: HOSPADM

## 2020-01-01 RX ORDER — ASPIRIN 81 MG/1
81 TABLET, CHEWABLE ORAL DAILY
Status: DISCONTINUED | OUTPATIENT
Start: 2020-01-01 | End: 2020-01-01 | Stop reason: HOSPADM

## 2020-01-01 RX ORDER — AMIODARONE HYDROCHLORIDE 200 MG/1
200 TABLET ORAL EVERY MORNING
COMMUNITY

## 2020-01-01 RX ORDER — AZITHROMYCIN 250 MG/1
500 TABLET, FILM COATED ORAL DAILY
Status: DISCONTINUED | OUTPATIENT
Start: 2020-01-01 | End: 2020-01-01 | Stop reason: HOSPADM

## 2020-01-01 RX ORDER — LORAZEPAM 2 MG/ML
1 INJECTION INTRAMUSCULAR EVERY 8 HOURS PRN
Status: DISCONTINUED | OUTPATIENT
Start: 2020-01-01 | End: 2020-01-01

## 2020-01-01 RX ORDER — ASCORBIC ACID 500 MG
500 TABLET ORAL DAILY
Status: DISCONTINUED | OUTPATIENT
Start: 2020-01-01 | End: 2020-01-01 | Stop reason: HOSPADM

## 2020-01-01 RX ORDER — ATORVASTATIN CALCIUM 40 MG/1
40 TABLET, FILM COATED ORAL NIGHTLY
Status: DISCONTINUED | OUTPATIENT
Start: 2020-01-01 | End: 2020-01-01 | Stop reason: HOSPADM

## 2020-01-01 RX ORDER — ONDANSETRON 2 MG/ML
4 INJECTION INTRAMUSCULAR; INTRAVENOUS EVERY 6 HOURS PRN
Status: DISCONTINUED | OUTPATIENT
Start: 2020-01-01 | End: 2020-01-01 | Stop reason: HOSPADM

## 2020-01-01 RX ORDER — FUROSEMIDE 10 MG/ML
INJECTION INTRAMUSCULAR; INTRAVENOUS
Status: DISPENSED
Start: 2020-01-01 | End: 2020-01-01

## 2020-01-01 RX ORDER — ZINC SULFATE 50(220)MG
50 CAPSULE ORAL DAILY
Status: DISCONTINUED | OUTPATIENT
Start: 2020-01-01 | End: 2020-01-01 | Stop reason: HOSPADM

## 2020-01-01 RX ADMIN — FUROSEMIDE 20 MG: 10 INJECTION, SOLUTION INTRAMUSCULAR; INTRAVENOUS at 11:52

## 2020-01-01 RX ADMIN — METOPROLOL SUCCINATE 50 MG: 50 TABLET, EXTENDED RELEASE ORAL at 20:09

## 2020-01-01 RX ADMIN — ATORVASTATIN CALCIUM 40 MG: 40 TABLET, FILM COATED ORAL at 20:23

## 2020-01-01 RX ADMIN — ZINC SULFATE 220 MG (50 MG) CAPSULE 50 MG: CAPSULE at 08:53

## 2020-01-01 RX ADMIN — ENOXAPARIN SODIUM 30 MG: 30 INJECTION SUBCUTANEOUS at 20:08

## 2020-01-01 RX ADMIN — ATORVASTATIN CALCIUM 40 MG: 40 TABLET, FILM COATED ORAL at 20:22

## 2020-01-01 RX ADMIN — LORAZEPAM 0.5 MG: 2 INJECTION INTRAMUSCULAR; INTRAVENOUS at 10:45

## 2020-01-01 RX ADMIN — ENOXAPARIN SODIUM 30 MG: 30 INJECTION SUBCUTANEOUS at 20:22

## 2020-01-01 RX ADMIN — ATORVASTATIN CALCIUM 40 MG: 40 TABLET, FILM COATED ORAL at 20:09

## 2020-01-01 RX ADMIN — OXYCODONE HYDROCHLORIDE AND ACETAMINOPHEN 500 MG: 500 TABLET ORAL at 08:52

## 2020-01-01 RX ADMIN — FUROSEMIDE 20 MG: 20 TABLET ORAL at 14:30

## 2020-01-01 RX ADMIN — OXYCODONE HYDROCHLORIDE AND ACETAMINOPHEN 500 MG: 500 TABLET ORAL at 14:52

## 2020-01-01 RX ADMIN — CETIRIZINE HYDROCHLORIDE 5 MG: 10 TABLET, FILM COATED ORAL at 08:00

## 2020-01-01 RX ADMIN — REMDESIVIR 100 MG: 100 INJECTION, POWDER, LYOPHILIZED, FOR SOLUTION INTRAVENOUS at 14:15

## 2020-01-01 RX ADMIN — ALBUTEROL SULFATE 2 PUFF: 90 AEROSOL, METERED RESPIRATORY (INHALATION) at 22:51

## 2020-01-01 RX ADMIN — TAMSULOSIN HYDROCHLORIDE 0.4 MG: 0.4 CAPSULE ORAL at 08:49

## 2020-01-01 RX ADMIN — AZITHROMYCIN MONOHYDRATE 500 MG: 250 TABLET ORAL at 08:49

## 2020-01-01 RX ADMIN — LORAZEPAM 1 MG: 2 INJECTION INTRAMUSCULAR; INTRAVENOUS at 23:04

## 2020-01-01 RX ADMIN — ZINC SULFATE 220 MG (50 MG) CAPSULE 50 MG: CAPSULE at 14:52

## 2020-01-01 RX ADMIN — MONTELUKAST SODIUM 10 MG: 10 TABLET, COATED ORAL at 20:09

## 2020-01-01 RX ADMIN — DOCUSATE SODIUM 100 MG: 100 CAPSULE, LIQUID FILLED ORAL at 08:49

## 2020-01-01 RX ADMIN — TAMSULOSIN HYDROCHLORIDE 0.4 MG: 0.4 CAPSULE ORAL at 08:00

## 2020-01-01 RX ADMIN — AMIODARONE HYDROCHLORIDE 200 MG: 200 TABLET ORAL at 08:49

## 2020-01-01 RX ADMIN — ENOXAPARIN SODIUM 30 MG: 30 INJECTION SUBCUTANEOUS at 08:06

## 2020-01-01 RX ADMIN — LORAZEPAM 1 MG: 2 INJECTION INTRAMUSCULAR; INTRAVENOUS at 09:00

## 2020-01-01 RX ADMIN — IPRATROPIUM BROMIDE AND ALBUTEROL SULFATE 1 AMPULE: .5; 3 SOLUTION RESPIRATORY (INHALATION) at 21:14

## 2020-01-01 RX ADMIN — ENOXAPARIN SODIUM 30 MG: 30 INJECTION SUBCUTANEOUS at 11:52

## 2020-01-01 RX ADMIN — MAGNESIUM OXIDE TAB 400 MG (241.3 MG ELEMENTAL MG) 400 MG: 400 (241.3 MG) TAB at 20:22

## 2020-01-01 RX ADMIN — LEVOTHYROXINE SODIUM 88 MCG: 0.09 TABLET ORAL at 04:54

## 2020-01-01 RX ADMIN — DEXAMETHASONE 6 MG: 4 TABLET ORAL at 08:06

## 2020-01-01 RX ADMIN — ZINC SULFATE 220 MG (50 MG) CAPSULE 50 MG: CAPSULE at 08:06

## 2020-01-01 RX ADMIN — FLUTICASONE PROPIONATE 2 SPRAY: 50 SPRAY, METERED NASAL at 08:52

## 2020-01-01 RX ADMIN — AMIODARONE HYDROCHLORIDE 200 MG: 200 TABLET ORAL at 08:00

## 2020-01-01 RX ADMIN — LORAZEPAM 1 MG: 2 INJECTION INTRAMUSCULAR; INTRAVENOUS at 00:43

## 2020-01-01 RX ADMIN — LORAZEPAM 1 MG: 2 INJECTION INTRAMUSCULAR; INTRAVENOUS at 10:36

## 2020-01-01 RX ADMIN — FINASTERIDE 5 MG: 5 TABLET, FILM COATED ORAL at 08:49

## 2020-01-01 RX ADMIN — PANTOPRAZOLE SODIUM 40 MG: 40 TABLET, DELAYED RELEASE ORAL at 07:59

## 2020-01-01 RX ADMIN — FLUTICASONE PROPIONATE 2 SPRAY: 50 SPRAY, METERED NASAL at 07:59

## 2020-01-01 RX ADMIN — MAGNESIUM OXIDE TAB 400 MG (241.3 MG ELEMENTAL MG) 400 MG: 400 (241.3 MG) TAB at 08:52

## 2020-01-01 RX ADMIN — ACETAMINOPHEN 650 MG: 325 TABLET, FILM COATED ORAL at 03:42

## 2020-01-01 RX ADMIN — DOCUSATE SODIUM 100 MG: 100 CAPSULE, LIQUID FILLED ORAL at 08:00

## 2020-01-01 RX ADMIN — ALBUTEROL SULFATE 2 PUFF: 90 AEROSOL, METERED RESPIRATORY (INHALATION) at 20:22

## 2020-01-01 RX ADMIN — CETIRIZINE HYDROCHLORIDE 5 MG: 10 TABLET, FILM COATED ORAL at 08:50

## 2020-01-01 RX ADMIN — LORAZEPAM 0.5 MG: 2 INJECTION INTRAMUSCULAR; INTRAVENOUS at 13:19

## 2020-01-01 RX ADMIN — OXYCODONE HYDROCHLORIDE AND ACETAMINOPHEN 500 MG: 500 TABLET ORAL at 08:06

## 2020-01-01 RX ADMIN — MAGNESIUM OXIDE TAB 400 MG (241.3 MG ELEMENTAL MG) 400 MG: 400 (241.3 MG) TAB at 08:06

## 2020-01-01 RX ADMIN — ALBUTEROL SULFATE 2 PUFF: 90 AEROSOL, METERED RESPIRATORY (INHALATION) at 20:17

## 2020-01-01 RX ADMIN — DEXAMETHASONE SODIUM PHOSPHATE 10 MG: 10 INJECTION INTRAMUSCULAR; INTRAVENOUS at 10:04

## 2020-01-01 RX ADMIN — DEXAMETHASONE 6 MG: 4 TABLET ORAL at 08:50

## 2020-01-01 RX ADMIN — MONTELUKAST SODIUM 10 MG: 10 TABLET, COATED ORAL at 20:22

## 2020-01-01 RX ADMIN — ENALAPRIL MALEATE 10 MG: 5 TABLET ORAL at 08:50

## 2020-01-01 RX ADMIN — REMDESIVIR 100 MG: 100 INJECTION, POWDER, LYOPHILIZED, FOR SOLUTION INTRAVENOUS at 14:20

## 2020-01-01 RX ADMIN — SODIUM CHLORIDE: 9 INJECTION, SOLUTION INTRAVENOUS at 09:22

## 2020-01-01 RX ADMIN — ENOXAPARIN SODIUM 30 MG: 30 INJECTION SUBCUTANEOUS at 08:52

## 2020-01-01 RX ADMIN — SODIUM CHLORIDE: 9 INJECTION, SOLUTION INTRAVENOUS at 05:02

## 2020-01-01 RX ADMIN — MAGNESIUM OXIDE TAB 400 MG (241.3 MG ELEMENTAL MG) 400 MG: 400 (241.3 MG) TAB at 20:23

## 2020-01-01 RX ADMIN — MAGNESIUM OXIDE TAB 400 MG (241.3 MG ELEMENTAL MG) 400 MG: 400 (241.3 MG) TAB at 20:09

## 2020-01-01 RX ADMIN — FUROSEMIDE 20 MG: 20 TABLET ORAL at 08:06

## 2020-01-01 RX ADMIN — MONTELUKAST SODIUM 10 MG: 10 TABLET, COATED ORAL at 20:23

## 2020-01-01 RX ADMIN — ALBUTEROL SULFATE 2 PUFF: 90 AEROSOL, METERED RESPIRATORY (INHALATION) at 14:39

## 2020-01-01 RX ADMIN — IPRATROPIUM BROMIDE AND ALBUTEROL SULFATE 1 AMPULE: .5; 3 SOLUTION RESPIRATORY (INHALATION) at 14:51

## 2020-01-01 RX ADMIN — ENOXAPARIN SODIUM 30 MG: 30 INJECTION SUBCUTANEOUS at 20:23

## 2020-01-01 RX ADMIN — ASPIRIN 81 MG: 81 TABLET, CHEWABLE ORAL at 08:00

## 2020-01-01 RX ADMIN — AZITHROMYCIN MONOHYDRATE 500 MG: 250 TABLET ORAL at 07:59

## 2020-01-01 RX ADMIN — FINASTERIDE 5 MG: 5 TABLET, FILM COATED ORAL at 08:00

## 2020-01-01 RX ADMIN — REMDESIVIR 200 MG: 100 INJECTION, POWDER, LYOPHILIZED, FOR SOLUTION INTRAVENOUS at 14:52

## 2020-01-01 RX ADMIN — LEVOTHYROXINE SODIUM 88 MCG: 0.09 TABLET ORAL at 04:29

## 2020-01-01 RX ADMIN — ASPIRIN 81 MG: 81 TABLET, CHEWABLE ORAL at 08:49

## 2020-01-01 RX ADMIN — ALBUTEROL SULFATE 2 PUFF: 108 AEROSOL, METERED RESPIRATORY (INHALATION) at 06:08

## 2020-01-01 RX ADMIN — IPRATROPIUM BROMIDE AND ALBUTEROL SULFATE 1 AMPULE: .5; 3 SOLUTION RESPIRATORY (INHALATION) at 02:05

## 2020-01-01 RX ADMIN — LEVOTHYROXINE SODIUM 88 MCG: 0.09 TABLET ORAL at 05:32

## 2020-01-01 RX ADMIN — ALBUTEROL SULFATE 2 PUFF: 90 AEROSOL, METERED RESPIRATORY (INHALATION) at 11:46

## 2020-01-01 RX ADMIN — PANTOPRAZOLE SODIUM 40 MG: 40 TABLET, DELAYED RELEASE ORAL at 08:50

## 2020-01-01 ASSESSMENT — PAIN SCALES - PAIN ASSESSMENT IN ADVANCED DEMENTIA (PAINAD)
FACIALEXPRESSION: 0
BREATHING: 1
TOTALSCORE: 3
CONSOLABILITY: 0
CONSOLABILITY: 0
BODYLANGUAGE: 1
TOTALSCORE: 1
BREATHING: 1
BODYLANGUAGE: 0
CONSOLABILITY: 0
BREATHING: 1
FACIALEXPRESSION: 0
BODYLANGUAGE: 0
NEGVOCALIZATION: 0
NEGVOCALIZATION: 1
TOTALSCORE: 1
NEGVOCALIZATION: 0
FACIALEXPRESSION: 0

## 2020-01-01 ASSESSMENT — PAIN SCALES - GENERAL: PAINLEVEL_OUTOF10: 4

## 2020-01-01 ASSESSMENT — PATIENT HEALTH QUESTIONNAIRE - PHQ9
DEPRESSION UNABLE TO ASSESS: FUNCTIONAL CAPACITY MOTIVATION LIMITS ACCURACY
DEPRESSION UNABLE TO ASSESS: FUNCTIONAL CAPACITY MOTIVATION LIMITS ACCURACY

## 2020-01-16 NOTE — PROGRESS NOTES
amiodarone, aspirin, metoprolol. No anticoagulation due to history of intracranial bleed. 3. Chronic diastolic heart failure ([address]  ECHO October 2019 with grade 1 diastolic dysfunction and severe aortic stenosis. Patient scheduled for cardiology follow up and will obtain note for review. Continue current regimen. Monitor fluid balance. Encourage low sodium diet with fluid restriction. 4. Hypothyroidism, unspecified type  Continue synthroid 88 mcg daily. Check tsh periodically. 5. CVA, old, hemiparesis ([address]  Continue statin and aspirin. Functional status at baseline    6. Chronic renal failure, stage 3 (moderate) (HCC)  Renal function has fluctuated but does seem to be gradually worsening over time. BUN 40/Cr 2 with last lab work. Will monitor closely. Consider nephrology follow up with worsening. CBC, bmp, tsh 1/20    Patient was seen and examined by Dr. Pema Ponce and plan of care reviewed.      SOL Harding on 1/16/2020

## 2020-03-02 NOTE — PROGRESS NOTES
[place]                                                                                                                                              Yearly H&P    Josue Adler   1935 03/02/20    CHIEF COMPLAINT:    Patient is a long term resient resident at the facility. Patient is due for yearly H&P. Medical problems include: CKD, HTN, CHF, CVA and others. HPI:   Mr. Leena Andrews is an 81 yo male with PMH of atrial fibrillation, CKD, HTN, CHF, CVA, and others who is a long term resident at this facility. He is due for an updated history and physical exam today. At time of exam today he is sitting up in his recliner in his room. states he has no problems today and is feeling pretty well.  continues to go out of the facility with his family often. Mild lower extremity edema stable. Uses lamberto hose occasionally. No shortness of breath. Denies nasal congestion or cough. No chest pain or palpitation. Vital signs stable. Appetite stable. Compliant with medications as ordered without reported side effect. Medication list and allergies reviewed as documented in the paper chart of the facility. Review of Systems  Constitutional: Denies fever or chills. Positive for chronic fatigue. Eyes: Denies eye pain or redness   HENT: Denies sore throat or nasal congestion. Respiratory: Denies cough or shortness of breath   Cardiovascular: Denies chest pain, positive for chronic swelling in BLEs and dyspnea on exertion   GI: Denies abdominal pain, nausea, vomiting, bloody stools or diarrhea   : Denies dysuria or frequency   Musculoskeletal: Denies acute neck pain or body aches. Positive for chronic arthralgias.    Integument: Denies rash or itching   Neurologic: Denies headache, dizziness, numbness, tingling. + right sided weakness from prior CVA (chronic and stable)   Psychiatric: Denies acute depression or acute anxiety     Past Medical History:   Diagnosis Date    Atrial fibrillation ([address]     Cerebral artery occlusion with cerebral infarction ([address]     Confusion     CVA (cerebral vascular accident) ([address]     Heart murmur     Hypertension     Kidney stone     Kidney stones     Pneumonia         Social History     Tobacco Use    Smoking status: Former Smoker     Packs/day: 3.00     Years: 5.00     Pack years: 15.00     Types: Cigarettes     Last attempt to quit: 1971     Years since quittin.1    Smokeless tobacco: Never Used   Substance Use Topics    Alcohol use: No    Drug use: No       Family history  Noncontributory    Vital Signs  Temp: 98.3 °F (36.8 °C)  Pulse: 70  Resp: 20  BP: (!) 140/68  SpO2: 95 %    Physical Exam  Constitutional: Well developed, well nourished, no acute distress   Eyes: PERRL, no scleral icterus, conjunctiva normal   HENT: Atraumatic, external ears normal, nose normal, oropharynx moist, no pharyngeal exudates. Neck- supple, no JVD, no lymphadenopathy or thyromegaly   Respiratory: No respiratory distress on room air, no wheezing, rales or rhonchi detected   Cardiovascular: Normal rate, normal rhythm, systolic murmur noted, no gallops, no rubs, trace edema in BLE R>L. GI: Soft, nondistended, normal bowel sounds, nontender, no organomegaly noted   Musculoskeletal: No cyanosis or obvious acute deformity. Integument: Warm and dry. No rash noted to exposed area   Neurologic: Alert & oriented x 3, Right sided hemiparesis from prior CVA, mild dysarthria noted. Psychiatric: behavior appropriate. Mood stable. Pleasant.      Recent lab:  Lab Results   Component Value Date     (L) 2020    K 4.7 2020    CL 99 2020    CO2 29 2020    BUN 40 (H) 2020    CREATININE 1.9 (H) 2020    GLUCOSE 108 (H) 2020    CALCIUM 8.7 2020    PROT 6.8 2020    LABALBU 3.7 2020    BILITOT 0.3 2020    ALKPHOS 98 2020    AST 29 2020    ALT 25 2020    LABGLOM 34 (L) 2020

## 2020-06-12 NOTE — PROGRESS NOTES
Denies headache, dizziness, numbness, tingling, right sided weakness from prior CVA (chronic and stable)   Psychiatric: Denies acute depression or acute anxiety     Vital Signs  Temp: 97.2 °F (36.2 °C)  Pulse: 76  Resp: 16  BP: 138/78  SpO2: 98 %    Physical Exam  Constitutional: Well developed, well nourished, no acute distress   Eyes: PERRL, no scleral icterus, conjunctiva normal   HENT: Atraumatic, external ears normal, nose normal, oropharynx moist, no pharyngeal exudates. Neck- supple, no JVD, no lymphadenopathy or thyromegaly   Respiratory: No respiratory distress on room air, no wheezing, rales or rhonchi detected   Cardiovascular: Normal rate, normal rhythm, systolic murmur noted, no gallops, no rubs, trace edema in BLE R>L. GI: Soft, nondistended, normal bowel sounds, nontender, no organomegaly noted   Musculoskeletal: No cyanosis or obvious acute deformity. Integument: Warm and dry. No rash noted to exposed area   Neurologic: Alert & to self, place, situation. Right sided hemiparesis from prior CVA, mild dysarthria noted. Psychiatric: behavior appropriate. Mood stable. Pleasant. Lab Results   Component Value Date     03/17/2020    K 4.8 03/17/2020     03/17/2020    CO2 26 03/17/2020    BUN 67 (H) 03/17/2020    CREATININE 2.2 (H) 03/17/2020    GLUCOSE 84 03/17/2020    CALCIUM 8.9 03/17/2020    PROT 6.8 01/27/2020    LABALBU 3.7 01/27/2020    BILITOT 0.3 01/27/2020    ALKPHOS 98 01/27/2020    AST 29 01/27/2020    ALT 25 01/27/2020    LABGLOM 29 (L) 03/17/2020    GFRAA 35 (L) 03/17/2020    AGRATIO 1.2 01/27/2020    GLOB 3.1 01/27/2020           Lab Results   Component Value Date    WBC 6.9 06/12/2020    HGB 11.2 (L) 06/12/2020    HCT 33.9 (L) 06/12/2020    MCV 96.3 06/12/2020     06/12/2020       Lab Results   Component Value Date    TSH 6.59 (H) 01/27/2020       Lab Results   Component Value Date    LABA1C 5.0 11/14/2018       ASSESSMENT/PLAN:     1.  Essential hypertension  BP is stable on current regimen. Continue metoprolol ER 50 mg, lasix 40 mg daily, aldactone 25 mg daily, hctz 12.5 mg daily. Also on flomax 0.4 mg daily. Monitor and adjust regimen as needed. 2. Atrial fibrillation, unspecified type (HCC)  Continue amiodarone, aspirin, metoprolol. No anticoagulation due to history of intracranial bleed and fall risk. 3. Chronic diastolic heart failure ([address]  ECHO October 2019 with grade 1 diastolic dysfunction and severe aortic stenosis. Patient scheduled for cardiology follow up at that time but did not go. Attempted to reschedule cardiology appointment but in setting of COVID 19 crisis all appointments are delayed. Continue current regimen. Monitor fluid balance. Encourage low sodium diet with fluid restriction. 4. Hypothyroidism, unspecified type  Continue synthroid 88 mcg daily. Last TSH slightly elevated at 6.5. no change to regimen at that time. Check level periodically. 5. CVA, old, hemiparesis ([address]  Continue statin and aspirin. Functional status at baseline. Make sure blood pressure and lipid profile under good control. 6. Chronic renal failure, stage 3 (moderate) (HCC)  Renal function slightly worse with last lab work with Cr 2.2. avoid nephrotoxic agents as able. Encourage oral intake. Monitor closely. Consider nephrology follow up if continues to worsen and patient agreeable. CBC, lipid, CMP, tsh ordered      Written by Ibrahima Ngo CMA acting as scribe for Dr. Myriam Espinal on 6/05/2020. I, Dr. Kwasi Ochoa, personally performed the services described in the documentation as scribed by Ibrahima Ngo CMA, in my presence and it is both accurate and complete.       Kwasi Ochoa MD on 6/05/2020

## 2020-08-12 PROBLEM — I35.0 AORTIC STENOSIS: Status: ACTIVE | Noted: 2020-01-01

## 2020-08-12 NOTE — PROGRESS NOTES
[address]   1935 08/12/20      CHIEF COMPLAINT:    Patient is long-term resident at the facility. Patient is due for a follow-up visit on medical problems which include CHF, CKD, CVA, HTN, declining functional status, and others. HPI:   Mr. Bishop Ray is an 81 yo male who is a long term resident at this facility. PMH includes CKD, CVA, HTN, CHF, declining functional status , and others. Since time of last visit he had worsening renal function and medication regimen changed. Also with mild hyperkalemia and potassium supplement discontinued. Repeat labs with improving renal function and potassium. At time of exam today patient sitting in his wheelchair. States he feels \"pretty good today. \" BP slightly low today at 90/68. He denies any dizziness or worsening weakness. functional status stable. mild chronic swelling in lower extremities. Patient has been compliant with taking medications. No side effect reported. Staff reported no acute issues since last visit. Patient's past medical, surgical,social and family histories were reviewed as documented in previous note/chart. Medication list and allergies reviewed as documented in the paper chart of the facility. Review of Systems  Constitutional: Denies fever or chills. Positive for chronic fatigue. Eyes: Denies eye pain or redness   HENT: Denies sore throat or nasal congestion. Respiratory: Denies cough or shortness of breath   Cardiovascular: Denies chest pain, positive for chronic swelling in BLEs and dyspnea on exertion   GI: Denies abdominal pain, nausea, vomiting, bloody stools or diarrhea   : Denies dysuria or frequency   Musculoskeletal: Denies acute neck pain or body aches. Positive for chronic arthralgias.    Integument: Denies rash or itching   Neurologic: Denies headache, dizziness, numbness, tingling, right sided weakness from prior CVA (chronic and stable)   Psychiatric: Denies acute depression or acute anxiety     Vital Signs  BP 90/68  HR 68  Temp 97.6 F  O2 sat 99% RA    Physical Exam  Constitutional: Well developed, well nourished, no acute distress   Eyes: PERRL, no scleral icterus, conjunctiva normal   HENT: Atraumatic, external ears normal, nose normal, oropharynx moist, no pharyngeal exudates. Neck- supple, no JVD, no lymphadenopathy or thyromegaly   Respiratory: No respiratory distress on room air, no wheezing, rales or rhonchi detected   Cardiovascular: Normal rate, normal rhythm, systolic murmur noted, no gallops, no rubs, trace edema in BLE R>L. GI: Soft, nondistended, normal bowel sounds, nontender, no organomegaly noted   Musculoskeletal: No cyanosis or obvious acute deformity. Integument: Warm and dry. No rash noted to exposed area   Neurologic: Alert & to self, place, situation. Right sided hemiparesis from prior CVA, mild dysarthria noted. Psychiatric: behavior appropriate. Mood stable. Pleasant. Lab Results   Component Value Date     08/04/2020    K 4.6 08/04/2020     08/04/2020    CO2 28 08/04/2020    BUN 38 (H) 08/04/2020    CREATININE 2.1 (H) 08/04/2020    GLUCOSE 79 08/04/2020    CALCIUM 8.6 08/04/2020    PROT 6.8 01/27/2020    LABALBU 3.7 01/27/2020    BILITOT 0.3 01/27/2020    ALKPHOS 98 01/27/2020    AST 29 01/27/2020    ALT 25 01/27/2020    LABGLOM 30 (L) 08/04/2020    GFRAA 36 (L) 08/04/2020    AGRATIO 1.2 01/27/2020    GLOB 3.1 01/27/2020           Lab Results   Component Value Date    WBC 7.1 08/04/2020    HGB 10.4 (L) 08/04/2020    HCT 31.6 (L) 08/04/2020    MCV 96.6 08/04/2020     08/04/2020       Lab Results   Component Value Date    TSH 2.55 06/12/2020       Lab Results   Component Value Date    LABA1C 5.0 11/14/2018       ASSESSMENT/PLAN:     1. Chronic renal failure, stage 3 (moderate) ([facility])  Renal function slightly worse over last couple of months. Aldactone decreased and hctz discontinued.  With mild hyperkalemia potassium was also discontinued. Renal function improving and near baseline with last labs with BUN 38/Cr 2.1. decrease lasix dosing today. Repeat labs ordered. Continue to monitor and adjust regimen as needed. 2. Chronic diastolic heart failure ([facility]  ECHO October 2019 with grade 1 diastolic dysfunction and severe aortic stenosis. Patient scheduled for cardiology follow up at that time but did not go. Attempted to reschedule cardiology appointment but in setting of COVID 19 crisis all appointments were delayed. Diuretic regimen recently adjusted and no evidence of fluid overload on exam. Continue current regimen. Monitor fluid balance. Encourage low sodium diet with fluid restriction. refer for cardiology follow up. 3. Declining functional status  Stable. Spends most of his time in wheelchair. Continue to monitor labs periodically. Encourage physical activity as tolerated. Encourage him to participate in facility activity as able. 4. CVA, old, hemiparesis ([facility]  Continue statin and aspirin. Monitor lipid profile periodically. Optimize bp control. 5. Atrial fibrillation, unspecified type (HCC)  Continue aspirin, metoprolol, amiodarone. No anticoagulation with history of intracranial bleeding and fall risk. 6. Aortic valve stenosis, etiology of cardiac valve disease unspecified  Severe aortic stenosis noted on last echocardiogram. Patient to have echocardiogram and cardiology follow up at [facility].     7. Essential HTN  BP slightly low. Decrease lasix dosing. Continue current regimen otherwise and monitor. CBC, BMP on 8/24    Patient was seen and examined by Dr. Chrystal Jane and plan of care reviewed.            SOL Broderick on 8/12/2020

## 2020-09-29 PROBLEM — U07.1 COVID-19: Status: ACTIVE | Noted: 2020-01-01

## 2020-09-29 NOTE — ED PROVIDER NOTES
[facility]      Pt Name: Cedric Doe  MRN: [id number]  [place] 1935  Date of evaluation: 9/29/2020  Provider: Sandee Small DO    CHIEF COMPLAINT       Chief Complaint   Patient presents with    Fall         HISTORY OF PRESENT ILLNESS   (Location/Symptom, Timing/Onset, Context/Setting, Quality, Duration, Modifying Factors, Severity)  Note limiting factors. Cedric Doe is a 80 y.o. male with history of CVA with right-sided deficits, atrial fibrillation, kidney stones, recent positive COVID-19 test who presents to the emergency department with complaint of hypoxia and falls. Patient reportedly was fatigued this morning and attempting to get out of his wheelchair and slipped and fell. There is no report of major head trauma or a high mechanism of injury. Reportedly just slid out of his wheelchair. He did test positive for COVID-19 around 4 to 5 days ago per his primary care physician Dr. Roz Villa. Apparently they took his vital signs this morning and found to be hypoxic in the mid 80s so placed on 6 L nasal cannula and transported to the hospital.  Patient denies fever, cough, chest pain, shortness of breath or any other complaints. Denies headache, neck pain or any other trauma. Reports he has chronic hip pain secondary to the rain but denies any new pain or trauma. Appropriate PPE was used including an eye shield, gloves, N95 mask, surgical mask during the entire patient encounter and exam.  If necessary (pt being intubated or aerosolizing equipment in use) a gown was also used. Nursing Notes were reviewed.       PAST MEDICAL HISTORY     Past Medical History:   Diagnosis Date    Atrial fibrillation [facility])     Cerebral artery occlusion with cerebral infarction ([address]     Confusion     CVA (cerebral vascular accident) ([address]     Heart murmur     Hypertension     Kidney stone     Kidney stones     Pneumonia daily as needed for Constipation    SODIUM CHLORIDE (OCEAN, BABY AYR) 0.65 % NASAL SPRAY    1 spray by Nasal route as needed for Congestion    SPIRONOLACTONE (ALDACTONE) 25 MG TABLET    Take 25 mg by mouth Five times weekly Patient takes on Monday, Tuesday, Wednesday, Thursday, Friday    TAMSULOSIN (FLOMAX) 0.4 MG CAPSULE    Take 0.4 mg by mouth daily    VITAMIN C (ASCORBIC ACID) 500 MG TABLET    Take 500 mg by mouth daily    ZINC SULFATE (ZINCATE) 220 (50 ZN) MG CAPSULE    Take 50 mg by mouth daily       ALLERGIES     Patient has no known allergies. FAMILY HISTORY     No family history on file.        SOCIAL HISTORY       Social History     Socioeconomic History    Marital status:      Spouse name: Not on file    Number of children: Not on file    Years of education: Not on file    Highest education level: Not on file   Occupational History    Not on file   Social Needs    Financial resource strain: Not on file    Food insecurity     Worry: Not on file     Inability: Not on file    Transportation needs     Medical: Not on file     Non-medical: Not on file   Tobacco Use    Smoking status: Former Smoker     Packs/day: 3.00     Years: 5.00     Pack years: 15.00     Types: Cigarettes     Last attempt to quit: 1971     Years since quittin.6    Smokeless tobacco: Never Used   Substance and Sexual Activity    Alcohol use: No    Drug use: No    Sexual activity: Not Currently   Lifestyle    Physical activity     Days per week: Not on file     Minutes per session: Not on file    Stress: Not on file   Relationships    Social connections     Talks on phone: Not on file     Gets together: Not on file     Attends Holiness service: Not on file     Active member of club or organization: Not on file     Attends meetings of clubs or organizations: Not on file     Relationship status: Not on file    Intimate partner violence     Fear of current or ex partner: Not on file     Emotionally abused: Not on file     Physically abused: Not on file     Forced sexual activity: Not on file   Other Topics Concern    Not on file   Social History Narrative    Not on file       SCREENINGS               Review of Systems  Constitutional:  Denies fever, chills  Eyes: denies eye problems  HEENT: denies sore throat or ear pain  Respiratory: denies cough or shortness of breath  Cardiovascular: denies chest pain, palpitations  GI: denies abdominal pain, nausea, vomiting, or diarrhea  Musculoskeletal: Reports chronic right hip pain  Skin: denies rash  Neurologic: denies focal weakness or sensory changes    Except as noted above the remainder of the review of systems was reviewed and negative. PHYSICAL EXAM    (up to 7 for level 4, 8 or more for level 5)     ED Triage Vitals   BP Temp Temp src Pulse Resp SpO2 Height Weight   -- -- -- -- -- -- -- --       General appearance: well-developed, well-nourished, no acute distress, nontoxic appearance  HENT: normocephalic, atraumatic, oropharynx moist, nares patent  Eyes: PERRLA, EOMI, conjunctiva normal  Neck: normal range of motion, no tenderness, trachea midline, no stridor  Respiratory: normal breath sounds, non labored breathing pattern  Cardiovascular: normal heart rate, normal rhythm  GI: nontender, bowel sounds normal, soft, nondistended, no pulsatile masses  Musculoskeletal: 4-5 strength in bilateral lower extremities, normal sensation, 2+ radial and DP pulses bilaterally, no edema, intact distal pulses, no clubbing, cyanosis.  Good range of motion  Back: no midline tenderness palpation, step-off or deformity  Integument: warm, dry, no erythema, no rash, < 2 second cap refill  Neurologic: alert and oriented ×3, no focal deficits appreciated    DIAGNOSTIC RESULTS     EKG: Normal sinus rhythm, rate 88, normal QRS, prolonged QT, no ST depression or elevation, poor R wave progression in the precordial leads, Q waves in the inferior leads    All EKG's are interpreted by the Emergency Department Physician who either signs or Co-signs this chart in the absence of a cardiologist.      RADIOLOGY:   Interpretation per the Radiologist below, if available at the time of this note:    XR CHEST PORTABLE   Final Result      1. New patchy perihilar and infrahilar areas of vague airspace consolidation. Slightly prominent suprahilar markings also noted, nonsegmental   2. Stable mild cardiac enlargement and tortuous aorta. Retrocardiac vague density could represent a hiatal hernia, similar compared to prior study               CT Head WO Contrast   Final Result      Chronic atrophic and remote ischemic changes of the brain without acute hemorrhage, edema or hydrocephalus. Prominent CSF spaces along the dura which could be from cerebral atrophy or chronic subdural hygromas, greater on the left side.             LABS:  Labs Reviewed   CBC WITH AUTO DIFFERENTIAL - Abnormal; Notable for the following components:       Result Value    RBC 3.26 (*)     Hemoglobin 10.0 (*)     Hematocrit 32.3 (*)     Neutrophils Absolute 9.0 (*)     Lymphocytes Absolute 0.9 (*)     All other components within normal limits    Narrative:     Performed at:  [address] [place] [address] Laboratory  [address] [place] [address], [employer] [address]   Phone [phone]   COMPREHENSIVE METABOLIC PANEL W/ REFLEX TO MG FOR LOW K - Abnormal; Notable for the following components:    BUN 31 (*)     CREATININE 1.6 (*)     GFR Non- 41 (*)     GFR  50 (*)     Alb 3.1 (*)     ALT 54 (*)     AST 54 (*)     All other components within normal limits    Narrative:     Performed at:  [address] [place] [address] Laboratory  [address] [place] [address]  [place], [employer] [address]   Phone [phone] - Abnormal; Notable for the following components:    Pro-BNP 6,878 (*)     All other components within normal limits    Narrative:     Performed at:  [facility]) - [place] and [address],  [place], [place] [address]   Phone [phone]   D-DIMER, QUANTITATIVE - Abnormal; Notable for the following components:    D-Dimer, Quant 2.72 (*)     All other components within normal limits    Narrative:     Ceci Garcia tel. [phone],  Coag results called to and read back by Sarah GOMEZ, 09/29/2020 10:11, by CHITO  Performed at:  [address] [place] [address],  [place], [place] [address]   Phone [phone]   CULTURE, BLOOD 1   CULTURE, BLOOD 2   TROPONIN    Narrative:     Performed at:  [address] [place] [address] Laboratory  [address] [place] [address],  [place], [place] [address]   Phone [phone]   LACTIC ACID, PLASMA    Narrative:     Performed at:  [address] [place] [address],  [place], [place] [address]   Phone [phone]   URINE RT REFLEX TO CULTURE       All other labs were within normal range or not returned as of this dictation. EMERGENCY DEPARTMENT COURSE and DIFFERENTIAL DIAGNOSIS/MDM:   Vitals:    Vitals:    09/29/20 1013 09/29/20 1042   BP:  114/70   Pulse:  88   Resp:  18   Temp: 98.6 °F (37 °C)    TempSrc: Oral    SpO2:  96%         MDM  40-year-old male presents the emergency department with complaint of 2 falls where he slid out of his wheelchair as well as hypoxia. Not on home O2. Recent known COVID-19 test that was positive. Vital signs here show hypoxia down to 88% on room air which improved to 93 to 94% on 3 L nasal cannula. Physical exam as above. He appears alert and awake and overall nontoxic. Breath sounds clear bilaterally. With recent known COVID-19 test droplet precautions were placed and we will get a septic work-up as well as give a dose of Decadron. Patient satting 95 to 96% on 3 L nasal cannula. No acute distress. CT head shows no acute injury.   Chest x-ray shows new patchy perihilar and infrahilar areas of vague airspace consolidation. Blood work shows an absolute lymphopenia. Creatinine around baseline at 1.6. Hemoglobin 10 without active signs of bleeding. No leukocytosis. BNP is elevated around 6800. D-dimer elevated at 2.72. Lactic acid normal.     I will discuss with Dr. Johanny England about admission to the hospital for further oxygenation and monitering. Discussed with Tennille with the hospitalist service. We will give a prophylactic dose of Lovenox with patient's elevated d-dimer as well as a dose of Lasix 20 mg with elevated BNP and perihilar congestion. ED crit    CRITICAL CARE:  The high probability of sudden, clinically significant deterioration in the patient's condition required the highest level of my preparedness to intervene urgently. The services I provided to this patient were to treat and/or prevent clinically significant deterioration that could result in:respiratory collapse. Services included the following: chart data review, reviewing nursing notes and/or old charts, documentation time, consultant collaboration regarding findings and treatment options, medication orders and management, direct patient care, re-evaluations, vital sign assessments and ordering, interpreting and reviewing diagnostic studies/lab tests. Aggregate critical care time was 35 minutes, which includes only time during which I was engaged in work directly related to the patient's care, as described above, whether at the bedside or elsewhere in the Emergency Department. It did not include time spent performing other reported procedures or the services of residents, students, nurses or physician assistants. CONSULTS:  None      FINAL IMPRESSION      1. Acute respiratory failure with hypoxia (HCC)    2. COVID-19 virus infection    3. Chronic kidney disease, unspecified CKD stage    4.  Elevated d-dimer          DISPOSITION/PLAN   DISPOSITION        PATIENT REFERRED TO:  No follow-up provider

## 2020-09-29 NOTE — ED NOTES
Patient to CT at this time. Patient informed a urine specimen was needed. He states he just voided before leaving nursing facility and couldn't at this time.       Carolyn Mejia RN  09/29/20 [id number]

## 2020-09-29 NOTE — ED NOTES
Dr. Keen Denilson out to talk to family and update them about patient     Terry Isabel RN  09/29/20 [id number]

## 2020-09-29 NOTE — ED NOTES
Report to Iwona Galan RN on medical unit at this time. Patient going to room 8-1.       Naila Leslie RN  09/29/20 3037

## 2020-09-29 NOTE — CARE COORDINATION
Spoke to family on the phone. Wife is POA and daughter, Char Kay, is second. They are going to talk to the brother and rest of the family to decide on code status. They are aware that pt will be full code unless they call and say otherwise. Verbalized understanding.

## 2020-09-29 NOTE — ED NOTES
Attempted to call [phone] to update regarding admission, no answer     Rm Gomez RN  09/29/20 1856

## 2020-09-29 NOTE — ACP (ADVANCE CARE PLANNING)
Advance Care Planning     Advance Care Planning Activator (Inpatient)  Conversation Note      Date of ACP Conversation: 9/29/2020    Conversation Conducted with: Patient with Decision Making Capacity    ACP Activator: Rasheeda Finch    *When Decision Maker makes decisions on behalf of the incapacitated patient: Decision Maker is asked to consider and make decisions based on patient values, known preferences, or best interests. Health Care Decision Maker:     Current Designated Health Care Decision Maker:   (If there is a valid [place] named in the [address] M[address]\" box in the ACP activity, but it is not visible above, be sure to open that field and then select the health care decision maker relationship (ie \"primary\") in the blank space to the right of the name.) Validate  this information as still accurate & up-to-date; edit [place] field as needed.)    Note: Assess and validate information in current ACP documents, as indicated. If no Decision Maker listed above or available through scanned documents, then:    If no Authorized Decision Maker has previously been identified, then patient chooses [place]:  \"Who would you like to name as your primary health care decision-maker? \"               Name: Maki Hernandez        Relationship: wife          Phone number: [phone]  Guided Interventions Portal this person be reached easily? \" Yes  \"Who would you like to name as your back-up decision maker? \"   Name: Lyndsey Pimentel        Relationship: child          Phone number: [phone]  Guided Interventions Portal this person be reached easily? \" Yes    Note: If the relationship of these Decision-Makers to the patient does NOT follow your state's Next of Kin hierarchy, recommend that patient complete ACP document that meets state-specific requirements to allow them to act on the patient's behalf when appropriate. Care Preferences    Ventilation:   \"If you were in your present state of health and suddenly became very ill and were unable to breathe on your own, what would your preference be about the use of a ventilator (breathing machine) if it were available to you? \"      Would the patient desire the use of ventilator (breathing machine)?: yes    \"If your health worsens and it becomes clear that your chance of recovery is unlikely, what would your preference be about the use of a ventilator (breathing machine) if it were available to you? \"     Would the patient desire the use of ventilator (breathing machine)?: Yes      Resuscitation  \"CPR works best to restart the heart when there is a sudden event, like a heart attack, in someone who is otherwise healthy. Unfortunately, CPR does not typically restart the heart for people who have serious health conditions or who are very sick. \"    \"In the event your heart stopped as a result of an underlying serious health condition, would you want attempts to be made to restart your heart (answer \"yes\" for attempt to resuscitate) or would you prefer a natural death (answer \"no\" for do not attempt to resuscitate)? \" yes      NOTE: If the patient has a valid advance directive AND now provides care preference(s) that are inconsistent with that prior directive, advise the patient to consider either: creating a new advance directive that complies with state-specific requirements; or, if that is not possible, orally revoking that prior directive in accordance with state-specific requirements, which must be documented in the EHR.       Conversation Outcomes:  [] ACP discussion completed  [] Existing advance directive reviewed with patient; no changes to patient's previously recorded wishes  [] New Advance Directive completed  [] Portable Do Not Rescitate prepared for Provider review and signature  [] POLST/POST/MOLST/MOST prepared for Provider review and signature      Follow-up plan:    [x] Schedule follow-up conversation to continue planning  [] Referred individual to Provider for additional questions/concerns   [x] Advised patient/agent/surrogate to review completed ACP document and update if needed with changes in condition, patient preferences or care setting    [] This note routed to one or more involved healthcare providers

## 2020-09-29 NOTE — ED NOTES
Patient resting in bed. Denies any pain or discomfort at this time. Will continue to monitor.       Shreyas Yeh RN  09/29/20 [id number]

## 2020-09-30 NOTE — PLAN OF CARE
Problem: Daily Care:  Goal: Daily care needs are met  Description: Daily care needs are met  9/30/2020 0104 by Celine Griffin RN  Outcome: Ongoing  9/30/2020 0103 by Celine Griffin RN  Outcome: Met This Shift     Problem: Discharge Planning:  Goal: Patients continuum of care needs are met  Description: Patients continuum of care needs are met  Outcome: Ongoing

## 2020-09-30 NOTE — PLAN OF CARE
Problem: Falls - Risk of:  Goal: Will remain free from falls  Description: Will remain free from falls  Outcome: Ongoing  Goal: Absence of physical injury  Description: Absence of physical injury  Outcome: Ongoing     Problem: Safety:  Goal: Free from accidental physical injury  Description: Free from accidental physical injury  Outcome: Ongoing     Problem: Daily Care:  Goal: Daily care needs are met  Description: Daily care needs are met  9/30/2020 0858 by Igor Du RN  Outcome: Ongoing  9/30/2020 0104 by Yassine Chapin RN  Outcome: Ongoing  9/30/2020 0103 by Yassine Chapin RN  Outcome: Met This Shift     Problem: Discharge Planning:  Goal: Patients continuum of care needs are met  Description: Patients continuum of care needs are met  9/30/2020 0103 by Yassine Chapin RN  Outcome: Ongoing

## 2020-09-30 NOTE — PROGRESS NOTES
Pt placed on heated high flow Airvo system per order. Pt stated that he did not like this system and requested the other NC. Explained to patient that on this system his oxygen level was improved. Flow and temperature was adjusted for patient comfort. Bipap was explained and offered at this time to which patient refused. RN and PA aware of patient's complaints and refusal of Bipap. Pt has requested that when it is time to sleep that he be placed back on other HFNC. Explained to patient that when that time came that we would re-evaluate and do what is best for him at that time. Patient verbalized understanding. Once on Airvo sats quickly rebounded to 93-96%. Sats continue to maintain > 92% on Airvo. Pt tolerating fairly well. See Oxygen flowsheet for settings. RN aware. -- Will continue to monitor. -- MARIA Arenas, CRT--

## 2020-09-30 NOTE — PROGRESS NOTES
RN spoke with pt family regarding code status. Verbal consent via telephone from Britt Serenity (spouse), Thurmon Leonorapj (daughter), and Veronika Robbins (son). Carolina Isidro RN 2nd witness to telephone call. Pt family to bring in copy of POA papers later this admission.

## 2020-09-30 NOTE — FLOWSHEET NOTE
trace edema in LLE and +1 edema in RLE. Pt call bell and bedside table within reach. Will continue to monitor pt.

## 2020-09-30 NOTE — PROGRESS NOTES
Pt requesting to be shaved. Pt did not want to take a bath today. Pt shaved at this time. Will reassess for bath tomorrow.

## 2020-09-30 NOTE — PROGRESS NOTES
Medication Reconciliation  Med rec performed for pt utilizing physician order form from [facility]. Changes made to home med list are below:  -Added lasix to home med lsit per NH. -Removed enalapril and HCTZ as they were not listed on the NH list.  -Changed mucinex from 1200mg q12h prn to 600mg q12h prn per NH. -Changed singulair from nightly to every morning per NH list.    Notified [facility] of above changes.     Ethel Salinas, JanD

## 2020-09-30 NOTE — H&P
History and Physical    Patient:  Mango Morales    CHIEF COMPLAINT:    Falls  Hypoxia    HISTORY OF PRESENT ILLNESS:   The patient is a 80 y.o. male resident of [address] [place] [address] and rehab with past medical history of intracranial bleed, atrial fibrillation, CVA, hypertension and recent COVID-19 test who presented to the emergency department for hypoxia and falls. Per report and chart review, patient noted to be fatigued yesterday morning when he attempted to get out of his wheelchair. He then slipped and fell onto the floor. He denied any injuries. Patient tested positive for COVID-19 on 9/26. Since that time he was started on azithromycin, magnesium per nursing home protocol. At baseline, patient does not require oxygen. Yesterday, oxygen sats dropped to mid 80s and he was placed on 6 L nasal cannula then transported to the ED. In the emergency department, oxygen sats were stable on 3 L. Routine work-up was conducted. EKG with normal sinus rhythm. Chest x-ray with new patchy perihilar and infrahilar areas of vague airspace consolidation. Slightly prominent suprahilar markings also noted, nonsegmental.  WBC 10.6, hemoglobin 10.0, sodium 137, potassium 4.3, CO2 25, BUN 31, creatinine 1.6, lactic acid 1.8, , proBNP 6878, troponin less than 0.30, glucose 106. Ferritin 951, INR 1.12, d-dimer 2.72, aPTT 36.8.  UA unremarkable. Patient was started on Remdesivir, dexamethasone, Lovenox and admitted for further care. Azithromycin was also continued. Overnight, oxygen sats were stable on 3-4 L. This morning, patient became hypoxic at 80% on 4 L nasal cannula. O2 was then titrated with improvement of oxygen saturations. Patient currently 94% on 12 L high flow nasal cannula. Long discussion held with patient's family regarding patient's increased O2 demand and potential need for intubation if hypoxia persist.  Family and patient do not wish for intubation or transfer.   Patient now DNR/DNI and wishes to continue care at [facility] and [place]. Past Medical History:      Diagnosis Date    [facility]rial fibrillation ([facility]     Cerebral artery occlusion with cerebral infarction ([address]     Confusion     CVA (cerebral vascular accident) ([facility] [facility]     Heart murmur     Hypertension     Kidney stone     Kidney stones     Pneumonia        Past Surgical History:  History reviewed. No pertinent surgical history. Medications Prior to Admission:    Prior to Admission medications    Medication Sig Start Date End Date Taking? Authorizing Provider   amiodarone (CORDARONE) 200 MG tablet Take 200 mg by mouth every morning   Yes Historical Provider, MD   furosemide (LASIX) 20 MG tablet Take 20 mg by mouth every morning   Yes Historical Provider, MD   spironolactone (ALDACTONE) 25 MG tablet Take 25 mg by mouth Five times weekly Patient takes on Monday, Tuesday, Wednesday, Thursday, Friday.  If systolic <81 or diastolic < 60 hold medication   Yes Historical Provider, MD   aspirin 81 MG EC tablet Take 81 mg by mouth daily    Yes Historical Provider, MD   acetaminophen (TYLENOL) 325 MG tablet Take 650 mg by mouth every 4 hours as needed for Pain   Yes Historical Provider, MD   azithromycin (ZITHROMAX) 500 MG tablet Take 500 mg by mouth nightly For 5 doses 9/25/20 9/30/20 Yes Historical Provider, MD   finasteride (PROSCAR) 5 MG tablet Take 5 mg by mouth daily   Yes Historical Provider, MD   ipratropium-albuterol (DUONEB) 0.5-2.5 (3) MG/3ML SOLN nebulizer solution Inhale 1 vial into the lungs every 6 hours as needed for Shortness of Breath   Yes Historical Provider, MD   enoxaparin (LOVENOX) 30 MG/0.3ML injection Inject 30 mg into the skin daily    Yes Historical Provider, MD   magnesium oxide (MAG-OX) 400 MG tablet Take 400 mg by mouth 2 times daily 9/25/20 10/24/20 Yes Historical Provider, MD   pantoprazole (PROTONIX) 40 MG tablet Take 40 mg by mouth nightly    Yes Historical Provider, MD   vitamin C (ASCORBIC ACID) 500 MG tablet Take 500 mg by mouth daily For 30 days 9/25/20 10/24/20 Yes Historical Provider, MD   ZINC SULFATE PO Take 220 mg by mouth daily For 30 days 9/25/20 10/24/20 Yes Historical Provider, MD   sodium chloride (OCEAN, BABY AYR) 0.65 % nasal spray 1 spray by Nasal route every 12 hours as needed    Yes Historical Provider, MD   levothyroxine (SYNTHROID) 88 MCG tablet Take 88 mcg by mouth Daily    Yes Historical Provider, MD   polyethylene glycol (GLYCOLAX) packet Take 17 g by mouth daily as needed for Constipation   Yes Historical Provider, MD   montelukast (SINGULAIR) 10 MG tablet Take 10 mg by mouth every morning    Yes Historical Provider, MD   loratadine (CLARITIN) 10 MG capsule Take 10 mg by mouth daily   Yes Historical Provider, MD   fluticasone (FLONASE) 50 MCG/ACT nasal spray 2 sprays by Each Nare route daily   Yes Historical Provider, MD   guaiFENesin (MUCINEX) 600 MG extended release tablet Take 600 mg by mouth every 12 hours as needed    Yes Historical Provider, MD   docusate (COLACE, DULCOLAX) 100 MG CAPS Take 100 mg by mouth daily 5/24/16  Yes KEVIN De Los Santos   atorvastatin (LIPITOR) 40 MG tablet Take 40 mg by mouth nightly   Yes Historical Provider, MD   metoprolol (TOPROL-XL) 50 MG XL tablet Take 50 mg by mouth nightly   Yes Historical Provider, MD   ondansetron (ZOFRAN-ODT) 4 MG disintegrating tablet Take 4 mg by mouth every 8 hours as needed for Nausea or Vomiting   Yes Historical Provider, MD   tamsulosin (FLOMAX) 0.4 MG capsule Take 0.4 mg by mouth daily   Yes Historical Provider, MD       Allergies:  Patient has no known allergies. Social History:   TOBACCO:   reports that he quit smoking about 49 years ago. His smoking use included cigarettes. He has a 15.00 pack-year smoking history. He has never used smokeless tobacco.  ETOH:   reports no history of alcohol use. OCCUPATION:  None     Family History:   History reviewed. No pertinent family history.     Review of system  Constitutional:  Denies fever or chills. Positive for fatigue. Eyes:  Denies eye pain or redness  HENT:  Denies nasal congestion or sore throat   Respiratory: Positive for cough. Denies shortness of breath  Cardiovascular:  Denies chest pain or edema   GI:  Denies abdominal pain, nausea, vomiting, bloody stools or diarrhea   :  Denies dysuria or frequency  Musculoskeletal:  Denies acute neck pain or body aches  Integument:  Denies rash or itching  Neurologic:  Denies headache, dizziness, numbness, tingling or unilateral weakness  Psychiatric:  Denies acute depression or acute anxiety      Vital Signs  Temp: 97.5 °F (36.4 °C)  Pulse: 72  Resp: 18  BP: 130/75  SpO2: 94 %  O2 Device: High flow nasal cannula  O2 Flow Rate (L/min): 12 L/min    vital signs reviewed in electronic chart. Physical exam  Constitutional:  Well developed, well nourished, elderly male sitting upright in bed in no acute distress. On 12 L high flow nasal cannula. Eyes:  PERRL, no scleral icterus, conjunctiva normal   HENT:  Atraumatic, external ears normal, nose normal, oropharynx moist, no pharyngeal exudates. Neck- supple, no JVD, no lymphadenopathy  Respiratory: Increased work of breathing noted. Decreased air movement throughout. Breath sounds decreased at bases. Crackles bilaterally, worse on the right. On 12 L nasal cannula. Cardiovascular:  Normal rate, normal rhythm, no murmurs, no gallops, no rubs, no edema   GI:  Soft, nondistended, normal bowel sounds, nontender, no voluntary guarding  Musculoskeletal:  No cyanosis or obvious acute deformity. Moving all extremities   Integument:  Warm and dry.   Lymphatic:  No cervical or axillary lymphadenopathy noted   Neurologic:  Alert & oriented x 3, no apparent focal deficits noted   Psychiatric:  Speech and behavior appropriate         Lab Results   Component Value Date     (L) 09/30/2020    K 4.2 09/30/2020     09/30/2020    CO2 24 09/30/2020    BUN 41 (H) 09/30/2020    CREATININE 1.6 (H) 09/30/2020    GLUCOSE 120 (H) 09/30/2020    CALCIUM 8.4 (L) 09/30/2020    PROT 6.5 09/30/2020    LABALBU 2.8 (L) 09/30/2020    BILITOT 0.6 09/30/2020    ALKPHOS 83 09/30/2020    AST 49 (H) 09/30/2020    ALT 49 (H) 09/30/2020    LABGLOM 41 (L) 09/30/2020    GFRAA 50 (L) 09/30/2020    AGRATIO 0.8 09/30/2020    GLOB 3.7 09/30/2020           Lab Results   Component Value Date    WBC 10.7 09/30/2020    HGB 10.5 (L) 09/30/2020    HCT 33.1 (L) 09/30/2020    MCV 98.5 09/30/2020     09/30/2020       PA/lat CXR:   XR CHEST PORTABLE   Final Result      1. New patchy perihilar and infrahilar areas of vague airspace consolidation. Slightly prominent suprahilar markings also noted, nonsegmental   2. Stable mild cardiac enlargement and tortuous aorta. Retrocardiac vague density could represent a hiatal hernia, similar compared to prior study               CT Head WO Contrast   Final Result      Chronic atrophic and remote ischemic changes of the brain without acute hemorrhage, edema or hydrocephalus. Prominent CSF spaces along the dura which could be from cerebral atrophy or chronic subdural hygromas, greater on the left side. EKG: Normal sinus rhythm, rate 88 bpm, normal QRS, prolonged QT, no ST depression or elevation, poor R wave progression in the precordial leads, Q waves in the inferior leads    Assessment and Plan     Active Hospital Problems    Diagnosis Date Noted    COVID-19 [U07.1]  -Tested positive at nursing home on 9/26/2020 and started on azithromycin, magnesium per nursing home protocol  -Had increased O2 demand beginning 9/29 and was transported to the emergency department. Oxygen sats initially maintained on 3-4 L nasal cannula however today, patient requiring 12 L high flow nasal cannula. Long discussion with patient's family today regarding possible need for intubation if he continues to have increased O2 demand.   Family and patient have elected to change CODE STATUS to DNR/DNI.   -if pt becomes hypoxic again with current O2 settings of 12L, will try to titrate up to 15L or place on bipap  -Had long discussion with patient's family 9/29 regarding importance of prophylactic Lovenox despite patient's history of intracranial hemorrhage given hypercoagulable state induced by COVID-19. Family is agreeable. -Continue Remdesivir, dexamethasone, azithromycin, magnesium and Lovenox.  -Isolation precautions   09/29/2020    Chronic diastolic heart failure ([facility] [address] [I50.32]  -Echocardiogram October 2019 with grade 1 diastolic dysfunction and severe aortic stenosis. Patient scheduled for cardiology follow-up at that time but did not go. Nursing home attempted to reschedule cardiology appointment but in setting of COVID-19 crisis, all appointments were delayed. -Continue home Lasix  -Monitor volume status   11/07/2019    Chronic renal failure, stage 3 (moderate) (HCC) [N18.3]  -Baseline serum creatinine around 2. Serum creatinine 1.6 on admission.  -Avoid nephrotoxins and NSAIDs       Hypothyroidism [E03.9]  -Continue home levothyroxine       CVA, old, hemiparesis ([facility] [address] [I69.359]  -Continue statin and aspirin   06/06/2016    Atrial fibrillation (HCC) [I48.91]  -Home regimen: Aspirin, metoprolol and amiodarone. Patient was not on anticoagulation prior to this admission due to history of intracranial bleeding and fall risk. As above, prophylactic Lovenox initiated. 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 Managed in [place]. Resolved without any intervention. 04/21/2016    Essential hypertension [I10]  -Blood pressure trend reviewed and currently normotensive without any antihypertensive medication.  -Monitor 01/18/2016     Patient was seen and examined by Dr. Gabe Kamara and plan of care reviewed.       Tennille Combs certifies per CMS regulation for 42 .15(a), that the patient may reasonably be expected to be discharged or transferred to a hospital within 96 hours after admission to [address] [place], [place] [address]    Electronically signed by SOL Hagen on 9/30/2020 at 2:05 PM

## 2020-09-30 NOTE — PROGRESS NOTES
Pt noted to desat to 84 - 85% with activity, pt quick to rebound sats back to 94%. RN are. Will continue to monitor. - MARIA Arenas, ÁNGEL--

## 2020-10-01 NOTE — PROGRESS NOTES
Pt family called for update. Pt family updated at this time. Pt resting in bed. Will continue to monitor pt.

## 2020-10-01 NOTE — PLAN OF CARE
Problem: Falls - Risk of:  Goal: Will remain free from falls  Description: Will remain free from falls  Outcome: Ongoing     Problem: Daily Care:  Goal: Daily care needs are met  Description: Daily care needs are met  Outcome: Ongoing

## 2020-10-01 NOTE — PROGRESS NOTES
Progress Note      Subjective:   Chief complaint:   Falls  Hypoxia    Interval History:   Pt still alert and awake. Per RN, he became upset last night and is still upset this morning because \"we aren't doing anything for him. \" Currently on heated high flow nasal cannula at 35% with O2 sat 92%. Review of systems:     Constitutional:  Denies fever or chills. Eyes:  Denies change in visual acuity or discharge. HENT:  Denies nasal congestion or sore throat. Respiratory: Positive for cough. Denies shortness of breath. Cardiovascular:  Denies chest pain, palpitation or swelling in LEs. GI:  Denies abdominal pain, nausea, vomiting, bloody stools or diarrhea. :  Denies dysuria or frequency. Musculoskeletal:  Denies back pain or joint pain. Integument:  Denies rash or itching. Neurologic:  Denies headache, focal weakness or sensory changes. Endocrine:  Denies polyuria or polydipsia. Lymphatic:  Denies swollen glands or night sweats. Psychiatric:  Denies depression or anxiety. Past medical history, surgical history, family history and social history reviewed and unchanged compared to H&P earlier this admission.     Medications:   Scheduled Meds:   furosemide  20 mg Oral Daily    amiodarone  200 mg Oral Daily    aspirin  81 mg Oral Daily    atorvastatin  40 mg Oral Nightly    docusate sodium  100 mg Oral Daily    finasteride  5 mg Oral Daily    fluticasone  2 spray Each Nostril Daily    azithromycin  500 mg Oral Daily    levothyroxine  88 mcg Oral Daily    cetirizine  5 mg Oral Daily    magnesium oxide  400 mg Oral BID    metoprolol succinate  50 mg Oral Nightly    montelukast  10 mg Oral Nightly    pantoprazole  40 mg Oral Daily    tamsulosin  0.4 mg Oral Daily    vitamin C  500 mg Oral Daily    zinc sulfate  50 mg Oral Daily    enoxaparin  30 mg Subcutaneous BID    remdesivir IVPB  100 mg Intravenous Q24H    dexamethasone  6 mg Oral Daily     Continuous Infusions:   sodium the emergency department. Oxygen sats initially maintained on 3-4 L nasal cannula however has had increasing O2 demand since admission. Currently on heated high flow nasal cannula at 35% with O2 sats 92%. -CODE STATUS changed to DNR/DNI on 9/30 per patient and family request.  Patient and family are comfortable with patient staying at [facility] and declined transfer.  -Continue Remdesivir, dexamethasone, azithromycin, magnesium and Lovenox.  -Isolation precautions    09/29/2020    Chronic diastolic heart failure ([facility] [address] [I50.32]  -Echocardiogram October 2019 with grade 1 diastolic dysfunction and severe aortic stenosis. Patient scheduled for cardiology follow-up at that time but did not go. Nursing home attempted to reschedule cardiology appointment but in setting of COVID-19 crisis, all appointments were delayed. -hold home Lasix  -Monitor volume status    11/07/2019    Chronic renal failure, stage 3 (moderate) (HCC) [N18.3]  -Baseline serum creatinine around 2. Serum creatinine 1.6 on admission, up to 1.9 today. Will add gentle hydration with IVFs. -hold home lasix  -Avoid nephrotoxins and NSAIDs         Hypothyroidism [E03.9]  -Continue home levothyroxine         CVA, old, hemiparesis ([facility] [address] [I69.359]  -Continue statin and aspirin    06/06/2016    Atrial fibrillation (HCC) [I48.91]  -Home regimen: Aspirin, metoprolol and amiodarone. Patient was not on anticoagulation prior to this admission due to history of intracranial bleeding and fall risk. As above, prophylactic Lovenox initiated.    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 Managed in [facility].   Resolved without any intervention.    04/21/2016    Essential hypertension [I10]  -Blood pressure trend reviewed and currently normotensive without any antihypertensive medication.  -Monitor        Patient was seen and examined by Dr. Barrera Llanes and plan of care reviewed.       Electronically signed by SOL Joseph on 10/1/2020 at 10:23 AM

## 2020-10-01 NOTE — FLOWSHEET NOTE
10/01/20 0820   Assessment   Charting Type Shift assessment   Neurological   Neuro (WDL) WDL   Level of Consciousness 0   [place] Coma Scale   Eye Opening 4   Best Verbal Response 5   Best Motor Response 6   [place] Coma Scale Score 15   NIH/MNHISS Stroke Scale   NIH/MNIHSS Stroke Scale Assessed No   HEENT   HEENT (WDL) X   Right Eye Impaired vision   Left Eye Impaired vision   Respiratory   Respiratory (WDL) X   L Breath Sounds Diminished   R Breath Sounds Diminished   Breath Sounds   Right Upper Lobe Diminished;Clear   Right Middle Lobe Diminished   Right Lower Lobe Diminished   Left Upper Lobe Diminished;Clear   Left Lower Lobe Diminished   Cough/Sputum   Sputum How Obtained Spontaneous cough   Cough Congested   Frequency Occasional   Sputum Amount Moderate   Sputum Color Yellow; White   Tenacity Thick   Cardiac   Cardiac (WDL) WDL   Cardiac Monitor   Telemetry Monitor On Yes   Telemetry Audible Yes   Telemetry Alarms Set Yes   Telemetry Box Number [id number]   Gastrointestinal   Abdominal (WDL) WDL   Hernia Umbilical   RUQ Bowel Sounds Active   LUQ Bowel Sounds Active   RLQ Bowel Sounds Active   LLQ Bowel Sounds Active   Peripheral Vascular   Edema Right lower extremity; Left lower extremity   RLE Edema +1   LLE Edema Trace   Skin Color/Condition   Skin Color/Condition (WDL) WDL   Skin Integrity   Skin Integrity (WDL) X   Skin Integrity Redness  (blanchable)   Location duane heels   Musculoskeletal   Musculoskeletal (WDL) X   RL Extremity Limited movement;Weakness   RUE Weakness;Limited movement   Musculoskeletal Details   R Hand   (hand drawn in, able to open)   Genitourinary   Genitourinary (WDL) WDL   Flank Tenderness No   Suprapubic Tenderness No   Dysuria No   Anus/Rectum   Anus/Rectum (WDL) WDL   Psychosocial   Psychosocial (WDL) WDL   Pt awake in bed. Pt alert and oriented. Pt very calm this am. Pt states being \"shakey\" this morning. Pt appears in no acute distress.  Pt currently on 35% heated hi flow nasal cannula. Pt lung sounds clear diminished in both upper lobes and diminished throughout bases. Pt encouraged to cough and deep breathe. Pt states occasionally coughing up yellow/white thick sputum. Pt has +1 edema in RLE and trace edema in LLE. Pt states breathing is okay this am. RN notes accessory muscle use with breathing. Pt O2 sat remains 87-92%. Pt will desat with eating and movement. Pt states not noting a difference when its low. Continuous pulse ox monitor in place. Pt call bell and bedside table within reach. Will continue to monitor pt.

## 2020-10-01 NOTE — PROGRESS NOTES
Pt continues to rest on heated HFNC airvo system. Pt is resting with eyes closed at this time with no s/s of respiratory distress noted. Sats maintaining >92%. Flow on airvo decreased to 40lpm at this time. FIO2 85%. Will continue to monitor. -- MARIA Arenas, CRT--

## 2020-10-01 NOTE — PLAN OF CARE
Problem: Falls - Risk of:  Goal: Will remain free from falls  Description: Will remain free from falls  10/1/2020 0818 by Igor Du RN  Outcome: Ongoing  10/1/2020 0659 by Yassine Chapin RN  Outcome: Ongoing  Goal: Absence of physical injury  Description: Absence of physical injury  Outcome: Ongoing     Problem: Safety:  Goal: Free from accidental physical injury  Description: Free from accidental physical injury  Outcome: Ongoing     Problem: Daily Care:  Goal: Daily care needs are met  Description: Daily care needs are met  10/1/2020 0818 by Igor Du RN  Outcome: Ongoing  10/1/2020 0659 by Yassine Chapin RN  Outcome: Ongoing

## 2020-10-01 NOTE — ACP (ADVANCE CARE PLANNING)
Advance Care Planning     Advance Care Planning Activator (Inpatient)  Conversation Note      Date of ACP Conversation: 9/29/2020    Conversation Conducted with: Patient with [address] 51: Next of Kin by law (only applies in absence of above) (name) Darya Lucero    ACP Activator: Marjorie Lawler    *When Decision Maker makes decisions on behalf of the incapacitated patient: Decision Maker is asked to consider and make decisions based on patient values, known preferences, or best interests. Health Care Decision Maker:     Current Designated Health Care Decision Maker:   (If there is a valid [place] named in the [address] M[employer]\" box in the ACP activity, but it is not visible above, be sure to open that field and then select the health care decision maker relationship (ie \"primary\") in the blank space to the right of the name.) Validate  this information as still accurate & up-to-date; edit [place] field as needed.)    Note: Assess and validate information in current ACP documents, as indicated. If no Decision Maker listed above or available through scanned documents, then:    If no Authorized Decision Maker has previously been identified, then patient chooses [place]:  \"Who would you like to name as your primary health care decision-maker? \"               Name: Darya Lucero        Relationship: wife          Phone number: [phone]  Rajendra Bush this person be reached easily? \" Yes  \"Who would you like to name as your back-up decision maker? \"   Name: Makeda Hammer        Relationship: child          Phone number: [phone]  Rajendra Six this person be reached easily? \" Yes    Note: If the relationship of these Decision-Makers to the patient does NOT follow your state's Next of Kin hierarchy, recommend that patient complete ACP document that meets state-specific requirements to allow them to act on the patient's behalf when appropriate. Care Preferences    Ventilation: \"If you were in your present state of health and suddenly became very ill and were unable to breathe on your own, what would your preference be about the use of a ventilator (breathing machine) if it were available to you? \"      Would the patient desire the use of ventilator (breathing machine)?: no    \"If your health worsens and it becomes clear that your chance of recovery is unlikely, what would your preference be about the use of a ventilator (breathing machine) if it were available to you? \"     Would the patient desire the use of ventilator (breathing machine)?: No      Resuscitation  \"CPR works best to restart the heart when there is a sudden event, like a heart attack, in someone who is otherwise healthy. Unfortunately, CPR does not typically restart the heart for people who have serious health conditions or who are very sick. \"    \"In the event your heart stopped as a result of an underlying serious health condition, would you want attempts to be made to restart your heart (answer \"yes\" for attempt to resuscitate) or would you prefer a natural death (answer \"no\" for do not attempt to resuscitate)? \" no      NOTE: If the patient has a valid advance directive AND now provides care preference(s) that are inconsistent with that prior directive, advise the patient to consider either: creating a new advance directive that complies with state-specific requirements; or, if that is not possible, orally revoking that prior directive in accordance with state-specific requirements, which must be documented in the EHR. [] Yes   [x] No   Educated Patient / Uvaldo Godoy regarding differences between Advance Directives and portable DNR orders.     Length of ACP Conversation in minutes:      Conversation Outcomes:  [] ACP discussion completed  [] Existing advance directive reviewed with patient; no changes to patient's previously recorded wishes  [] New Advance Directive completed  [] Portable Do Not Rescitate prepared for Provider review and signature  [x] POLST/POST/MOLST/MOST prepared for Provider review and signature      Follow-up plan:    [] Schedule follow-up conversation to continue planning  [] Referred individual to Provider for additional questions/concerns   [x] Advised patient/agent/surrogate to review completed ACP document and update if needed with changes in condition, patient preferences or care setting    [] This note routed to one or more involved healthcare providers

## 2020-10-01 NOTE — PROGRESS NOTES
Pt family called to get an update. RN updated family at this time. Pt resting in bed. Will continue to monitor pt.

## 2020-10-01 NOTE — PROGRESS NOTES
Patient very agitated at the beginning of shift. Has calmed down now and cooperative. BP was 80/48 jonas Null aware, order was to hold BP meds. All other evening meds taken. Patient asleep at this time. Will continue to monitor.

## 2020-10-02 NOTE — PROGRESS NOTES
Patient pulled his bipap off again, his sat was 82% on room air. I placed him back on Airvo HFNC @ 92% with 64 lpm nc. Sat began to rise, then drop again. Patient became lethargic, but would still answer questions when prompted. Sat 80%. Family here at this time and going to gown up and go see patient.

## 2020-10-02 NOTE — PROGRESS NOTES
Called to patients room. SpO2 80-84%. Increased flow to 60% on Airvo. FiO2 95%. Checked pulse ox placement and changed sight to finger. Pulse ox has a good pleth. Patient unable to use MDI with spacer. Duo neb was given. More aeration post treatment. Pt continued to drop 78-80%, placed NRB 15 lpm over Airvo. Pt was agreeable to BiPAP. BiPAP was initiated 14/8 rate of 14 100% FiO2. Titrated FiO2 to 70%. Patient resting comfortably. SpO2 95%. Will continue to monitor.

## 2020-10-02 NOTE — ED NOTES
May Smith arrived for body . Made aware that pt is a droplet+ precautions for COVID-19.  Departed with body at 1845

## 2020-10-02 NOTE — DISCHARGE SUMMARY
Patient name: Maria Dolores Landin    YOB: 1935    Date of death: 10/2/2020    Time of death: 16:00    Date of admission: 9/30/20      Hospital Course: Mr. Maria Dolores Landin was an 79-year-old male long-term resident of [address] [place] [address] and rehab with past medical history of intracranial bleed, atrial fibrillation, CVA, hypertension and recent COVID-19 positive test who was admitted on 9/30/2020 for hypoxia and falls. Patient found to have increased O2 demand from a baseline of no oxygen to 3 L at time of admission. He had tested positive for COVID-19 on 9/26 at nursing home and was started on azithromycin, magnesium, vitamin C and zinc per nursing home protocol. Oxygen sats found to be in the mid 80s at nursing home and he was placed on 6 L nasal cannula then transported to the ED. In the emergency department, oxygen sats were stable on 3 L and he was admitted. He was started on Remdesvir, Decadron, Lovenox and continued on azithromycin, magnesium, vitamin C and zinc.  Over hospital course, patient had further increasing O2 demands and overall decline despite uptitrating oxygen to 15 L high flow nasal cannula and transitioning to 100% FiO2 assist heated high flow nasal cannula and ultimately BiPAP. Patient and family requested BiPAP be removed. Patient remained hypoxic on high flow nasal cannula and ultimately passed on 10/2/2020 at 16:00. Family informed.

## 2020-10-02 NOTE — PROGRESS NOTES
Progress Note      Subjective:   Chief complaint:   Hypoxia  Falls    Interval History:   Patient continues to have increased O2 demand. This morning, oxygen sats fell to 60% while on heated high flow nasal cannula. Patient then transitioned to BiPAP with improvement of oxygen sats to 92%, however patient reluctant to wear BiPAP. Long discussion with patient while on bipap and his family (outside of room). Patient and family do not want BiPAP. They understand patient's prognosis is poor and are agreeable for nasal cannula only. Plan to continue current treatment as outlined below. Review of systems:   Constitutional:  Denies fever or chills. Positive for generalized weakness and fatigue. Eyes:  Denies change in visual acuity or discharge. HENT:  Denies nasal congestion or sore throat. Respiratory:  Denies cough. Positive for shortness of breath. Cardiovascular:  Denies chest pain, palpitation or swelling in LEs. GI:  Denies abdominal pain, nausea, vomiting, bloody stools or diarrhea. :  Denies dysuria or frequency. Musculoskeletal:  Denies back pain or joint pain. Integument:  Denies rash or itching. Neurologic:  Denies headache, focal weakness or sensory changes. Endocrine:  Denies polyuria or polydipsia. Lymphatic:  Denies swollen glands or night sweats. Psychiatric:  Denies depression or anxiety. Past medical history, surgical history, family history and social history reviewed and unchanged compared to H&P earlier this admission.     Medications:   Scheduled Meds:   furosemide  20 mg Oral Daily    amiodarone  200 mg Oral Daily    aspirin  81 mg Oral Daily    atorvastatin  40 mg Oral Nightly    docusate sodium  100 mg Oral Daily    finasteride  5 mg Oral Daily    fluticasone  2 spray Each Nostril Daily    azithromycin  500 mg Oral Daily    levothyroxine  88 mcg Oral Daily    cetirizine  5 mg Oral Daily    magnesium oxide  400 mg Oral BID    metoprolol succinate  50 mg Oral Nightly    montelukast  10 mg Oral Nightly    pantoprazole  40 mg Oral Daily    tamsulosin  0.4 mg Oral Daily    vitamin C  500 mg Oral Daily    zinc sulfate  50 mg Oral Daily    enoxaparin  30 mg Subcutaneous BID    remdesivir IVPB  100 mg Intravenous Q24H    dexamethasone  6 mg Oral Daily     Continuous Infusions:   sodium chloride 50 mL/hr at 10/02/20 0502       Objective:     Vital Signs  Temp: 97.3 °F (36.3 °C)  Pulse: 58  Resp: 14  BP: (!) 61/33  SpO2: (!) 72 %  O2 Device: High flow nasal cannula  O2 Flow Rate (L/min): 64 L/min    Vital signs reviewed in electronic charts. Physical exam  Constitutional:  Well developed, well nourished, elderly male laying in bed with bipap in place. Falls asleep mid conversation. Alert and oriented and answers all questions appropriately. Eyes:  PERRL, conjunctiva normal, EOMI. HENT:  Atraumatic, external ears normal, external nose/nares normal, oropharynx moist, no pharyngeal exudates. Neck:  Supple. No JVD or thyromegaly. Respiratory:  Diminished throughout, worse at the bases. No rales or rhonci. On bipap. Cardiovascular:  Normal rate, normal rhythm, no murmurs, no gallops, no rubs. GI:  Soft, nondistended, normal bowel sounds, nontender, no organomegaly, no mass. :  No costovertebral angle tenderness. Musculoskeletal:  No edema, no tenderness, no obvious deformities. Patient is moving all extremities. Integument:  Well hydrated, no rash. Lymphatic:  No cervical or axillary lymphadenopathy noted. Neurologic:  Alert & oriented x 3,  no focal deficits noted. Strength is equal throughout. Psychiatric:  Speech and behavior appropriate.     Results:     Lab Results   Component Value Date    WBC 12.7 (H) 10/02/2020    HGB 9.3 (L) 10/02/2020    HCT 29.1 (L) 10/02/2020    MCV 97.3 10/02/2020     10/02/2020       Lab Results   Component Value Date     10/02/2020    K 3.5 10/02/2020    CL 99 10/02/2020    CO2 22 10/02/2020    BUN 60 10/02/2020    CREATININE 2.0 10/02/2020    GLUCOSE 135 10/02/2020    CALCIUM 7.7 10/02/2020        Assessment and Plan: Active Hospital Problems     Diagnosis Date Noted    COVID-19 [U07.1]  -Tested positive at nursing home on 9/26/2020 and started on azithromycin, magnesium per nursing home protocol  -Had increased O2 demand beginning 9/29 and was transported to the emergency department.  Oxygen sats initially maintained on 3-4 L nasal cannula however has had increasing O2 demand since admission. Was placed on heated high flow nasal cannula 9/30 with gradual increase of support needed. On maximal settings of heated high flow NC 10/1. Had episode of hypoxia AM of 10/2 with O2 sats 60's while on heater high flow NC and transitioned to bipap.   -pt and family do not want bipap; bipap discontinued later this morning and pt transitioned back to high flow NC. Remains hypoxic with most recent sat 66%. -DNR/DNI. Patient and family are comfortable with patient staying at [facility] and declined transfer days ago. -Continue Remdesivir, dexamethasone, azithromycin, magnesium and Lovenox.  -Isolation precautions    09/29/2020    Chronic diastolic heart failure ([facility] [address] [I50.32]  -Echocardiogram October 2019 with grade 1 diastolic dysfunction and severe aortic stenosis.  Patient scheduled for cardiology follow-up at that time but did not go.  Nursing home attempted to reschedule cardiology appointment but in setting of COVID-19 crisis, all appointments were delayed.   -hold home Lasix  -Monitor volume status    11/07/2019    Chronic renal failure, stage 3 (moderate) (HCC) [N18.3]  -Baseline serum creatinine around 2.  Serum creatinine 1.6 on admission, now up to baseline of 2.0.  -hold home lasix  -Avoid nephrotoxins and NSAIDs         Hypothyroidism [E03.9]  -Continue home levothyroxine         CVA, old, hemiparesis ([facility] [address] [I69.359]  -Continue statin and aspirin    06/06/2016    Atrial fibrillation ([facility] [address] [I48.91]  -Home regimen: Aspirin, metoprolol and amiodarone.  Patient was not on anticoagulation prior to this admission due to history of intracranial bleeding and fall risk.  As above, prophylactic Lovenox initiated.    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  Managed in [facility] without any intervention.    04/21/2016    Essential hypertension [I10]  -Blood pressure trend reviewed and currently normotensive without any antihypertensive medication.  -Monitor      Patient was seen and examined by Dr. Naila Cook and plan of care reviewed.       Electronically signed by SOL Piper on 10/2/2020 at 1:35 PM

## 2020-10-02 NOTE — PROGRESS NOTES
Patient's sats on bipap with 80% O2 dropping in the 86-88% range. Patient states he is uncomfortable and the machine is blowing too hard and the mask is too tight. I loosened the mask, pulled him up in bed, and increased the O2 on the bipap to 100%. I spoke with him about the need for him to let the doctor know his wishes, since he is so uncomfortable on the bipap. I told him I would take him off and place him back on the HFNC if he decided that is what he wanted to do, but he may not do well on it, since his O2 would not maintain on it. He said he would speak to the doctor.

## 2020-10-02 NOTE — FLOWSHEET NOTE
Received report from [facility], reports that through out night SpO2 dropped multiple time throughout the night and pt was placed on BiPaP, on handoff this morning at 0730, pt was on 80% FiO2 on Bipap.   0740- SpO2 to maintaining between 87-92% on 80%. RT to bedside, increased to 100% FiO2. Pt is Alert and responds appropriately at this time. SpO2 %. 0830- bedside monitoring alarming low SpO2, Don PPE and to bedside, BiPaP was off of patients face, eyes closed, pale cyanotic in color. Replaced back on patient. Remained at bedside with patient. Pt moaned in response to touch. After several minutes of being back on bipap, SpO2 improved to stay around 93% on 100% FiO2. Pt is responding verbally, with 1 word responses, goes back to sleep does not remain alert. Placed Call to speak with family, requested that family gather to have conversation with Providers. Reports they will return call once they are with the patients wife. 0900- Pt given 1mg IV ativan push, due to patient is restless in bed, keep fidgeting in bed, pulling at BiPaP.     0920- Family spoke with Providers, updated on pt deteriorating. Per [facility], it is families wishes that pt be comfort measure only at this time. Pt to be removed from BiPAP and placed back on on high Flow Nasal Cannula. 5249- family dressed in full PPE, and escorted to bedside to be with patient. Pt responded verbally breafly to family. No change.         10/02/20 0840   Assessment   Charting Type Shift assessment   Neurological   Level of Consciousness 1   Bledsoe Coma Scale   Eye Opening 3   Best Verbal Response 4   Best Motor Response 5   Bledsoe Coma Scale Score 12   Respiratory   L Breath Sounds Rhonchi   R Breath Sounds Rhonchi   Breath Sounds   Right Upper Lobe Rhonchi   Right Middle Lobe Rhonchi   Right Lower Lobe Rhonchi   Left Upper Lobe Rhonchi   Left Lower Lobe Rhonchi   Cardiac   Cardiac (WDL) X   Rhythm Interpretation   Pulse 79   Cardiac Monitor Telemetry Monitor On Yes   Telemetry Audible Yes   Telemetry Alarms Set Yes   Telemetry Box Number [id number]

## 2020-10-05 LAB
BLOOD CULTURE, ROUTINE: NORMAL
CULTURE, BLOOD 2: NORMAL
CULTURE, BLOOD 3: NORMAL